# Patient Record
Sex: FEMALE | Race: WHITE | NOT HISPANIC OR LATINO | URBAN - METROPOLITAN AREA
[De-identification: names, ages, dates, MRNs, and addresses within clinical notes are randomized per-mention and may not be internally consistent; named-entity substitution may affect disease eponyms.]

---

## 2021-05-31 ENCOUNTER — EMERGENCY (EMERGENCY)
Facility: HOSPITAL | Age: 71
LOS: 1 days | Discharge: NURSING, REHAB OR LONG-TERM CARE | End: 2021-05-31
Attending: EMERGENCY MEDICINE | Admitting: EMERGENCY MEDICINE
Payer: MEDICARE

## 2021-05-31 VITALS
OXYGEN SATURATION: 100 % | TEMPERATURE: 98 F | RESPIRATION RATE: 18 BRPM | HEIGHT: 63 IN | DIASTOLIC BLOOD PRESSURE: 78 MMHG | SYSTOLIC BLOOD PRESSURE: 145 MMHG | HEART RATE: 62 BPM | WEIGHT: 117.95 LBS

## 2021-05-31 VITALS
OXYGEN SATURATION: 97 % | DIASTOLIC BLOOD PRESSURE: 75 MMHG | RESPIRATION RATE: 16 BRPM | TEMPERATURE: 98 F | SYSTOLIC BLOOD PRESSURE: 142 MMHG | HEART RATE: 65 BPM

## 2021-05-31 PROCEDURE — 99284 EMERGENCY DEPT VISIT MOD MDM: CPT | Mod: 25

## 2021-05-31 PROCEDURE — 72125 CT NECK SPINE W/O DYE: CPT | Mod: 26,MA

## 2021-05-31 PROCEDURE — 70450 CT HEAD/BRAIN W/O DYE: CPT

## 2021-05-31 PROCEDURE — 12001 RPR S/N/AX/GEN/TRNK 2.5CM/<: CPT

## 2021-05-31 PROCEDURE — 70450 CT HEAD/BRAIN W/O DYE: CPT | Mod: 26,MA

## 2021-05-31 PROCEDURE — 72125 CT NECK SPINE W/O DYE: CPT

## 2021-05-31 NOTE — ED PROVIDER NOTE - OBJECTIVE STATEMENT
Pt is 70 Pt is 71 yo female who presents to the ED with a cc of laceration. PMHx of dementia. Pt with dementia unable to answer questions at this time. Per EMS reports aides were attempting to dress the pt when she became agitated, went to swing at a staff member, lost her balance and fell backwards striking her head on the edge of the door frame and sustaining a laceration to the back of her head. Denies LOC. Staff reports that the pt is otherwise at baseline Pt is 71 yo female who presents to the ED with a cc of laceration. PMHx of dementia, HLD, hypothyroidism. Pt with dementia unable to answer questions at this time. Per EMS reports aides were attempting to dress the pt when she became agitated, went to swing at a staff member, lost her balance and fell backwards striking her head on the edge of the door frame and sustaining a laceration to the back of her head. Denies LOC. Staff reports that the pt is otherwise at baseline

## 2021-05-31 NOTE — ED ADULT TRIAGE NOTE - CHIEF COMPLAINT QUOTE
according to EMS pt was agitated when NA was washing her - she went to swing her arm & fell backwards hitting head on door frame- laceration noted. No LOC

## 2021-05-31 NOTE — ED PROVIDER NOTE - CLINICAL SUMMARY MEDICAL DECISION MAKING FREE TEXT BOX
Pt is 69 yo female who presents to the ED with a cc of laceration. PMHx of dementia. Pt with dementia unable to answer questions at this time. Per EMS reports aides were attempting to dress the pt when she became agitated, went to swing at a staff member, lost her balance and fell backwards striking her head on the edge of the door frame and sustaining a laceration to the back of her head. Denies LOC. Staff reports that the pt is otherwise at baseline Pt is 71 yo female who presents to the ED with a cc of laceration. PMHx of dementia. Pt with dementia unable to answer questions at this time. Per EMS reports aides were attempting to dress the pt when she became agitated, went to swing at a staff member, lost her balance and fell backwards striking her head on the edge of the door frame and sustaining a laceration to the back of her head. Denies LOC. Staff reports that the pt is otherwise at baseline. Pt with witnessed mechanical fall no LOC no AC. Sustained scalp laceration. Will clean and repair wound. As pt has advanced dementia and is unable to provide history will obtain CT head

## 2021-05-31 NOTE — ED PROVIDER NOTE - PATIENT PORTAL LINK FT
You can access the FollowMyHealth Patient Portal offered by [facility] by registering at the following website: [email or web address]. By joining [facility]’s FollowMyHealth portal, you will also be able to view your health information using other applications (apps) compatible with our system.

## 2021-05-31 NOTE — ED PROVIDER NOTE - NSFOLLOWUPINSTRUCTIONS_ED_ALL_ED_FT
Return to the ED for any new or worsening symptoms  Take your medication as prescribed  Staple removal in 7-10 days   Advance activity as tolerated

## 2021-05-31 NOTE — ED ADULT NURSE NOTE - OBJECTIVE STATEMENT
Presents to ED via amb from facility. Pt was agitated with NA who was bathing her so she went to swing her arm & fell backwards hitting her head on door frame. No LOC. Sustained laceration to back of head. According to staff pt is her baseline. No vomiting

## 2021-05-31 NOTE — ED PROVIDER NOTE - PROGRESS NOTE DETAILS
spoke with son Negrito confirms that mother is at baseline with advanced dementia. Plan reviewed. Results of imaging reviewed in agreement with discharge home. Pt has been calm during visit with no episodes of combativeness

## 2021-06-07 ENCOUNTER — EMERGENCY (EMERGENCY)
Facility: HOSPITAL | Age: 71
LOS: 1 days | Discharge: HOME | End: 2021-06-07
Attending: EMERGENCY MEDICINE | Admitting: EMERGENCY MEDICINE
Payer: MEDICARE

## 2021-06-07 VITALS
RESPIRATION RATE: 18 BRPM | SYSTOLIC BLOOD PRESSURE: 111 MMHG | HEART RATE: 82 BPM | TEMPERATURE: 99 F | OXYGEN SATURATION: 96 % | DIASTOLIC BLOOD PRESSURE: 56 MMHG | WEIGHT: 119.93 LBS

## 2021-06-07 VITALS
OXYGEN SATURATION: 98 % | DIASTOLIC BLOOD PRESSURE: 64 MMHG | SYSTOLIC BLOOD PRESSURE: 123 MMHG | TEMPERATURE: 97 F | HEART RATE: 74 BPM | RESPIRATION RATE: 16 BRPM

## 2021-06-07 LAB
ALBUMIN SERPL ELPH-MCNC: 3.5 G/DL — SIGNIFICANT CHANGE UP (ref 3.3–5)
ALP SERPL-CCNC: 72 U/L — SIGNIFICANT CHANGE UP (ref 40–120)
ALT FLD-CCNC: 18 U/L — SIGNIFICANT CHANGE UP (ref 12–78)
AMMONIA BLD-MCNC: 27 UMOL/L — SIGNIFICANT CHANGE UP (ref 11–32)
ANION GAP SERPL CALC-SCNC: 5 MMOL/L — SIGNIFICANT CHANGE UP (ref 5–17)
APPEARANCE UR: ABNORMAL
AST SERPL-CCNC: 18 U/L — SIGNIFICANT CHANGE UP (ref 15–37)
BASOPHILS # BLD AUTO: 0 K/UL — SIGNIFICANT CHANGE UP (ref 0–0.2)
BASOPHILS NFR BLD AUTO: 0 % — SIGNIFICANT CHANGE UP (ref 0–2)
BILIRUB SERPL-MCNC: 0.5 MG/DL — SIGNIFICANT CHANGE UP (ref 0.2–1.2)
BILIRUB UR-MCNC: ABNORMAL
BUN SERPL-MCNC: 29 MG/DL — HIGH (ref 7–23)
CALCIUM SERPL-MCNC: 9.3 MG/DL — SIGNIFICANT CHANGE UP (ref 8.5–10.1)
CHLORIDE SERPL-SCNC: 109 MMOL/L — HIGH (ref 96–108)
CO2 SERPL-SCNC: 31 MMOL/L — SIGNIFICANT CHANGE UP (ref 22–31)
COLOR SPEC: YELLOW — SIGNIFICANT CHANGE UP
CREAT SERPL-MCNC: 0.9 MG/DL — SIGNIFICANT CHANGE UP (ref 0.5–1.3)
DIFF PNL FLD: ABNORMAL
EOSINOPHIL # BLD AUTO: 0.04 K/UL — SIGNIFICANT CHANGE UP (ref 0–0.5)
EOSINOPHIL NFR BLD AUTO: 1 % — SIGNIFICANT CHANGE UP (ref 0–6)
GLUCOSE SERPL-MCNC: 98 MG/DL — SIGNIFICANT CHANGE UP (ref 70–99)
GLUCOSE UR QL: NEGATIVE — SIGNIFICANT CHANGE UP
HCT VFR BLD CALC: 38.2 % — SIGNIFICANT CHANGE UP (ref 34.5–45)
HGB BLD-MCNC: 12.6 G/DL — SIGNIFICANT CHANGE UP (ref 11.5–15.5)
KETONES UR-MCNC: ABNORMAL
LEUKOCYTE ESTERASE UR-ACNC: ABNORMAL
LYMPHOCYTES # BLD AUTO: 0.84 K/UL — LOW (ref 1–3.3)
LYMPHOCYTES # BLD AUTO: 21 % — SIGNIFICANT CHANGE UP (ref 13–44)
MCHC RBC-ENTMCNC: 30.4 PG — SIGNIFICANT CHANGE UP (ref 27–34)
MCHC RBC-ENTMCNC: 33 GM/DL — SIGNIFICANT CHANGE UP (ref 32–36)
MCV RBC AUTO: 92 FL — SIGNIFICANT CHANGE UP (ref 80–100)
MONOCYTES # BLD AUTO: 0.76 K/UL — SIGNIFICANT CHANGE UP (ref 0–0.9)
MONOCYTES NFR BLD AUTO: 19 % — HIGH (ref 2–14)
NEUTROPHILS # BLD AUTO: 2.37 K/UL — SIGNIFICANT CHANGE UP (ref 1.8–7.4)
NEUTROPHILS NFR BLD AUTO: 59 % — SIGNIFICANT CHANGE UP (ref 43–77)
NITRITE UR-MCNC: NEGATIVE — SIGNIFICANT CHANGE UP
NRBC # BLD: SIGNIFICANT CHANGE UP /100 WBCS (ref 0–0)
PH UR: 5 — SIGNIFICANT CHANGE UP (ref 5–8)
PLATELET # BLD AUTO: 215 K/UL — SIGNIFICANT CHANGE UP (ref 150–400)
POTASSIUM SERPL-MCNC: 4.1 MMOL/L — SIGNIFICANT CHANGE UP (ref 3.5–5.3)
POTASSIUM SERPL-SCNC: 4.1 MMOL/L — SIGNIFICANT CHANGE UP (ref 3.5–5.3)
PROT SERPL-MCNC: 7.3 G/DL — SIGNIFICANT CHANGE UP (ref 6–8.3)
PROT UR-MCNC: 30 MG/DL
RBC # BLD: 4.15 M/UL — SIGNIFICANT CHANGE UP (ref 3.8–5.2)
RBC # FLD: 12.5 % — SIGNIFICANT CHANGE UP (ref 10.3–14.5)
SARS-COV-2 RNA SPEC QL NAA+PROBE: SIGNIFICANT CHANGE UP
SODIUM SERPL-SCNC: 145 MMOL/L — SIGNIFICANT CHANGE UP (ref 135–145)
SP GR SPEC: 1.02 — SIGNIFICANT CHANGE UP (ref 1.01–1.02)
TSH SERPL-MCNC: 0.55 UIU/ML — SIGNIFICANT CHANGE UP (ref 0.36–3.74)
UROBILINOGEN FLD QL: 1
WBC # BLD: 4.01 K/UL — SIGNIFICANT CHANGE UP (ref 3.8–10.5)
WBC # FLD AUTO: 4.01 K/UL — SIGNIFICANT CHANGE UP (ref 3.8–10.5)

## 2021-06-07 PROCEDURE — 71045 X-RAY EXAM CHEST 1 VIEW: CPT | Mod: 26

## 2021-06-07 PROCEDURE — 84443 ASSAY THYROID STIM HORMONE: CPT

## 2021-06-07 PROCEDURE — 70450 CT HEAD/BRAIN W/O DYE: CPT

## 2021-06-07 PROCEDURE — 85025 COMPLETE CBC W/AUTO DIFF WBC: CPT

## 2021-06-07 PROCEDURE — 99284 EMERGENCY DEPT VISIT MOD MDM: CPT | Mod: 25

## 2021-06-07 PROCEDURE — 70450 CT HEAD/BRAIN W/O DYE: CPT | Mod: 26,MA

## 2021-06-07 PROCEDURE — 81001 URINALYSIS AUTO W/SCOPE: CPT

## 2021-06-07 PROCEDURE — 80053 COMPREHEN METABOLIC PANEL: CPT

## 2021-06-07 PROCEDURE — 87086 URINE CULTURE/COLONY COUNT: CPT

## 2021-06-07 PROCEDURE — 36415 COLL VENOUS BLD VENIPUNCTURE: CPT

## 2021-06-07 PROCEDURE — U0005: CPT

## 2021-06-07 PROCEDURE — 82140 ASSAY OF AMMONIA: CPT

## 2021-06-07 PROCEDURE — 71045 X-RAY EXAM CHEST 1 VIEW: CPT

## 2021-06-07 PROCEDURE — U0003: CPT

## 2021-06-07 PROCEDURE — 99284 EMERGENCY DEPT VISIT MOD MDM: CPT

## 2021-06-07 NOTE — ED ADULT NURSE NOTE - CHIEF COMPLAINT QUOTE
Patient is breathing normally. Closed the eyes and lips  tightly.  Able to follow commandments. H/O psychosis

## 2021-06-07 NOTE — ED PROVIDER NOTE - NEUROLOGICAL, MLM
somnolent, but will open eyes. answers some questions and follows some commands (then goes back to bed)

## 2021-06-07 NOTE — ED PROVIDER NOTE - ATTENDING CONTRIBUTION TO CARE
71 yo f who is resident of [facility] neurocognitive do psychosis and dementia sent for exacerbation of psychotic features pt is poort historian  on eval  keeps eyes closed answers yes and no to questions not oriented no distress  heent nc at mmm perrla eomi  cor rrr  chest cta  abd soft flat with loose skin evid of wt loss no grossly visible scars or skin infectino  back normal  ext pt has no deformity is moving all ext  neuro pt sleepy awakens to verbal and simple commands no focal  plan ct labs ro infection ro trauma ro metabolic vs exacerbation of the underlying psychosis.  matt pmd

## 2021-06-07 NOTE — ED ADULT NURSE NOTE - ED STAT RN HANDOFF DETAILS
Sarai from [facility] called for update; explained all tests done and results, advised waiting for ambulance p/u expected around 2000

## 2021-06-07 NOTE — ED PROVIDER NOTE - NSFOLLOWUPINSTRUCTIONS_ED_ALL_ED_FT
drink plenty of fluids  continue medications as prescribed   have close follow up with primary care provider       Weakness    WHAT YOU NEED TO KNOW:    Weakness is a loss of muscle strength. It may be caused by brain, nerve, or muscle problems. Physical and mental conditions such as heart problems, pregnancy, dehydration, or depression may also cause weakness. Reactions to certain drugs can cause weakness. Parts of your body may become weak if you need to wear a cast or splint or have been on bed rest for a long time.    DISCHARGE INSTRUCTIONS:    Call 911 for any of the following:   •You have any of the following signs of a stroke: ?Numbness or drooping on one side of your face       ?Weakness in an arm or leg      ?Confusion or difficulty speaking      ?Dizziness, a severe headache, or vision loss      •You lose feeling in your weakened body area.      •You have electric shock-like feelings down your arms and legs when you flex or move your neck.      •You have sudden or increased trouble speaking, swallowing, or breathing.      Return to the emergency department if:   •You have severe pain in your back, arms, or legs that worsens.      •You have sudden or worsened muscle weakness or loss of movement.      •You are not able to control when you urinate or have a bowel movement.      Contact your healthcare provider if:   •You feel depressed or anxious.       •You have questions or concerns about your condition or care.       Manage weakness:   •Use assistive devices as directed. These help protect you from injury. Examples include a walker or cane. Have someone install handrails in your home. These will help you get out of a bathtub or stand up from a toilet. Use a shower chair so you can sit while you shower. Sit down on the toilet or another chair to dry off and put on your clothes. Get help going up and down stairs if your legs are weak.       •Go to physical or occupational therapy if directed. A physical therapist can teach you exercises to help strengthen weak muscles. An occupational therapist can show you ways to do your daily activities more easily. For example, light forks and spoons can be easier to use if you have hand weakness. You may also learn ways to organize your household items so you are not moving heavy items.      •Balance rest with exercise. Exercise can help increase your muscle strength and energy. Do not exercise for long periods at a time. Take breaks often to rest. Too much exercise can cause muscle strain or make you more tired. Ask your healthcare provider how much exercise is right for you.      •Eat a variety of healthy foods. Too much or too little food may cause weakness or tiredness. Ask your healthcare provider what a healthy amount of food is for you. Healthy foods include fruits, vegetables, whole-grain breads, low-fat dairy products, lean meats and fish, nuts, and cooked beans.      •Do not smoke. Nicotine and other chemicals in cigarettes and cigars can make your symptoms worse, and can cause lung damage. Ask your healthcare provider for information if you currently smoke and need help to quit. E-cigarettes or smokeless tobacco still contain nicotine. Talk to your healthcare provider before you use these products.       •Do not use caffeine, alcohol, or illegal drugs. These may cause muscle twitching, which could lead to worsened weakness.       Follow up with your healthcare provider as directed: Write down your questions so you remember to ask them during your visits.

## 2021-06-07 NOTE — ED PROVIDER NOTE - PATIENT PORTAL LINK FT
You can access the FollowMyHealth Patient Portal offered by [facility] by registering at the following website: [email or web address]. By joining [employer]’s FollowMyHealth portal, you will also be able to view your health information using other applications (apps) compatible with our system.

## 2021-06-07 NOTE — ED PROVIDER NOTE - CLINICAL SUMMARY MEDICAL DECISION MAKING FREE TEXT BOX
sent to ED for not answering questions of following commands. limited history due to dementia and neurocognitive disorder with psychosis. patient somnolent on exam but will open eyes and follows some commands and answers some questions (not all). afebrile. vitals stable. does not appear to be in distress or pain. differential include but not limited to dehydration, electrolyte abnormality, anemia, ICH, CVA, infection. will check labs, UA, CXR, CT head

## 2021-06-07 NOTE — ED PROVIDER NOTE - CARE PLAN
Principal Discharge DX:	Weakness  Secondary Diagnosis:	Neurocognitive disorder  Secondary Diagnosis:	Dementia

## 2021-06-07 NOTE — ED ADULT NURSE NOTE - PMH
CAD (coronary artery disease)    Constipation    Dementia    Hypothyroidism    Neurocognitive disorder  with psychosis

## 2021-06-07 NOTE — ED ADULT NURSE NOTE - NSIMPLEMENTINTERV_GEN_ALL_ED
Implemented All Fall Risk Interventions:  [place] to call system. Call bell, personal items and telephone within reach. Instruct patient to call for assistance. Room bathroom lighting operational. Non-slip footwear when patient is off stretcher. Physically safe environment: no spills, clutter or unnecessary equipment. Stretcher in lowest position, wheels locked, appropriate side rails in place. Provide visual cue, wrist band, yellow gown, etc. Monitor gait and stability. Monitor for mental status changes and reorient to person, place, and time. Review medications for side effects contributing to fall risk. Reinforce activity limits and safety measures with patient and family.

## 2021-06-07 NOTE — ED PROVIDER NOTE - OBJECTIVE STATEMENT
70 year old female with history of neurocognitive disorder with psychosis, hypothyroid, CAD, dementia, and constipation BIBA for not answering questions or following commands. no known 70 year old female with history of neurocognitive disorder with psychosis, hypothyroid, CAD, dementia, and constipation BIBA for not answering questions or following commands. no known fevers, recent illnesses, vomiting, or diarrhea. history of neurocognitive disorder with psychosis. limited history due to dementia. resident at [facility]  PCP Rober

## 2021-06-07 NOTE — ED ADULT TRIAGE NOTE - CHIEF COMPLAINT QUOTE
Patient is breathing normally. Closed the eyes and lips  tightly.  Able to follow commandments. H/O psychosis Patient is breathing normally. Closed the eyes and lips  tightly.  Able to follow comands. H/O psychosis

## 2021-06-07 NOTE — ED ADULT NURSE NOTE - OBJECTIVE STATEMENT
Pt to ED via ambulance sent by [facility] assisted living for evaluation.  Per EMS, pt was "unresponsive."  However, pt has history of psychosis and on arrival refuses to speak but follows simple commands and is aware enough to hold herself when she feels as if she is falling (when being moved from stretcher to stretcher).  Pt answered question one time when asked her last name, she stated "Cedric."  Otherwise, pt in no distress, resting with eyes closed, resp even & unlabored.

## 2021-06-08 PROBLEM — F03.90 UNSPECIFIED DEMENTIA, UNSPECIFIED SEVERITY, WITHOUT BEHAVIORAL DISTURBANCE, PSYCHOTIC DISTURBANCE, MOOD DISTURBANCE, AND ANXIETY: Chronic | Status: ACTIVE | Noted: 2021-05-31

## 2021-06-08 PROBLEM — E03.9 HYPOTHYROIDISM, UNSPECIFIED: Chronic | Status: ACTIVE | Noted: 2021-05-31

## 2021-06-08 PROBLEM — E78.5 HYPERLIPIDEMIA, UNSPECIFIED: Chronic | Status: ACTIVE | Noted: 2021-05-31

## 2021-06-08 LAB
CULTURE RESULTS: SIGNIFICANT CHANGE UP
SPECIMEN SOURCE: SIGNIFICANT CHANGE UP

## 2021-06-13 ENCOUNTER — EMERGENCY (EMERGENCY)
Facility: HOSPITAL | Age: 71
LOS: 1 days | Discharge: NURSING, REHAB OR LONG-TERM CARE | End: 2021-06-13
Attending: EMERGENCY MEDICINE | Admitting: EMERGENCY MEDICINE
Payer: MEDICARE

## 2021-06-13 VITALS
OXYGEN SATURATION: 96 % | TEMPERATURE: 99 F | RESPIRATION RATE: 20 BRPM | DIASTOLIC BLOOD PRESSURE: 82 MMHG | HEART RATE: 82 BPM | SYSTOLIC BLOOD PRESSURE: 127 MMHG

## 2021-06-13 VITALS
HEIGHT: 63 IN | TEMPERATURE: 99 F | RESPIRATION RATE: 18 BRPM | OXYGEN SATURATION: 96 % | DIASTOLIC BLOOD PRESSURE: 84 MMHG | SYSTOLIC BLOOD PRESSURE: 134 MMHG | WEIGHT: 117.95 LBS | HEART RATE: 90 BPM

## 2021-06-13 PROBLEM — E03.9 HYPOTHYROIDISM, UNSPECIFIED: Chronic | Status: ACTIVE | Noted: 2021-06-07

## 2021-06-13 PROBLEM — I25.10 ATHEROSCLEROTIC HEART DISEASE OF NATIVE CORONARY ARTERY WITHOUT ANGINA PECTORIS: Chronic | Status: ACTIVE | Noted: 2021-06-07

## 2021-06-13 PROBLEM — R41.9 UNSPECIFIED SYMPTOMS AND SIGNS INVOLVING COGNITIVE FUNCTIONS AND AWARENESS: Chronic | Status: ACTIVE | Noted: 2021-06-07

## 2021-06-13 PROBLEM — K59.00 CONSTIPATION, UNSPECIFIED: Chronic | Status: ACTIVE | Noted: 2021-06-07

## 2021-06-13 PROCEDURE — 99284 EMERGENCY DEPT VISIT MOD MDM: CPT

## 2021-06-13 PROCEDURE — 99284 EMERGENCY DEPT VISIT MOD MDM: CPT | Mod: 25

## 2021-06-13 PROCEDURE — 70450 CT HEAD/BRAIN W/O DYE: CPT

## 2021-06-13 PROCEDURE — 72125 CT NECK SPINE W/O DYE: CPT | Mod: 26,MH

## 2021-06-13 PROCEDURE — 70450 CT HEAD/BRAIN W/O DYE: CPT | Mod: 26,MH

## 2021-06-13 PROCEDURE — 72125 CT NECK SPINE W/O DYE: CPT

## 2021-06-13 RX ORDER — LEVOTHYROXINE SODIUM 125 MCG
1 TABLET ORAL
Qty: 0 | Refills: 0 | DISCHARGE

## 2021-06-13 RX ORDER — ESCITALOPRAM OXALATE 10 MG/1
1 TABLET, FILM COATED ORAL
Qty: 0 | Refills: 0 | DISCHARGE

## 2021-06-13 RX ORDER — LANOLIN ALCOHOL/MO/W.PET/CERES
1 CREAM (GRAM) TOPICAL
Qty: 0 | Refills: 0 | DISCHARGE

## 2021-06-13 RX ORDER — DONEPEZIL HYDROCHLORIDE 10 MG/1
1 TABLET, FILM COATED ORAL
Qty: 0 | Refills: 0 | DISCHARGE

## 2021-06-13 RX ORDER — ATORVASTATIN CALCIUM 80 MG/1
1 TABLET, FILM COATED ORAL
Qty: 0 | Refills: 0 | DISCHARGE

## 2021-06-13 RX ORDER — HALOPERIDOL DECANOATE 100 MG/ML
2 INJECTION INTRAMUSCULAR
Qty: 0 | Refills: 0 | DISCHARGE

## 2021-06-13 NOTE — ED PROVIDER NOTE - CLINICAL SUMMARY MEDICAL DECISION MAKING FREE TEXT BOX
[id number] 71 yo F with hx of cad, dementia biba from NH for eval of fall from chair this morning, not on AC, pt denies any complaints but poor historian; abd soft and NT, no chest wall ecchymosis no C/T/L spine ecchymosis, FROM X4, nonfocal neuro exam; will get ct head and cspine, d/c if neg

## 2021-06-13 NOTE — ED PROVIDER NOTE - MUSCULOSKELETAL, MLM
Spine appears normal, range of motion is not limited, no muscle or joint tenderness, no C/T/L spine ttp/ecchymosis noted

## 2021-06-13 NOTE — ED PROVIDER NOTE - CARE PLAN
Principal Discharge DX:	Fall   Principal Discharge DX:	Head injury  Secondary Diagnosis:	Fall, initial encounter

## 2021-06-13 NOTE — ED ADULT NURSE REASSESSMENT NOTE - NS ED NURSE REASSESS COMMENT FT1
Picked up for transport back to Nursing [facility] facility All belongings in bag with patient on transport stretcher.

## 2021-06-13 NOTE — ED ADULT NURSE NOTE - PMH
CAD (coronary artery disease)    Constipation    Dementia    Dementia    HLD (hyperlipidemia)    Hypothyroidism    Hypothyroidism    Neurocognitive disorder  with psychosis

## 2021-06-13 NOTE — ED PROVIDER NOTE - PATIENT PORTAL LINK FT
You can access the FollowMyHealth Patient Portal offered by [facility] by registering at the following website: [email or web address]. By joining [employer]’s FollowMyHealth portal, you will also be able to view your health information using other applications (apps) compatible with our system.

## 2021-06-13 NOTE — ED PROVIDER NOTE - CONSTITUTIONAL, MLM
normal... appears somnolent but opens eyes and follows some commands, pleasantly confused and in no apparent distress. Chronically ill appearing, pleasantly confused and in no apparent distress.

## 2021-06-13 NOTE — ED ADULT NURSE REASSESSMENT NOTE - NS ED NURSE REASSESS COMMENT FT1
Patient ambulated approx. 30 feet with assistance, PA [place] witnessed walk. Patient denies pain, denies SOB. C collar was cleared by Dr Cuello prior to ambulation.

## 2021-06-13 NOTE — ED PROVIDER NOTE - CARE PROVIDER_API CALL
Tristan Richter)  Critical Care Medicine; Internal Medicine; Pulmonary Disease  [facility] [place] [address]  Phone: [phone]  Fax: [phone]  Follow Up Time: 1-3 Days

## 2021-06-13 NOTE — ED PROVIDER NOTE - RESPIRATORY, MLM
Breath sounds clear and equal bilaterally. No chest wall ecchymosis notedc Breath sounds clear and equal bilaterally. No ribs tenderness or ecchymosis noted

## 2021-06-13 NOTE — ED ADULT NURSE NOTE - OBJECTIVE STATEMENT
Un witnessed fall from a chair. No LOC. Denies pain. BIBA from [facility], c collar in place.

## 2021-06-13 NOTE — ED PROVIDER NOTE - NEUROLOGICAL, MLM
opens eyes, pleasantly confused. no focal deficits, no motor or sensory deficits. opens eyes, pleasantly confused, follows some commands. no focal deficits, no motor or sensory deficits.

## 2021-06-13 NOTE — ED PROVIDER NOTE - ENMT, MLM
Airway patent, Nasal mucosa clear. Mouth with normal mucosa. EOMI Airway patent, Nasal mucosa clear.

## 2021-06-13 NOTE — ED ADULT NURSE NOTE - INTERVENTIONS DEFINITIONS
[place] to call system/Instruct patient to call for assistance/Stretcher in lowest position, wheels locked, appropriate side rails in place/Provide visual clues: red socks

## 2021-06-13 NOTE — ED PROVIDER NOTE - OBJECTIVE STATEMENT
71 y/o F with hx of dementia, neurocognitive disorder with psychosis, hypothyroid, CAD BIBA from [facility] for evaluation s/p fall today. As per EMS, pt fell from chair today. No use of anticoagulants as per medication records. Unable to obtain info from pt due to dementia. Denies any pain or complaints at this time.  pcp: Dr. Richter

## 2021-06-28 ENCOUNTER — EMERGENCY (EMERGENCY)
Facility: HOSPITAL | Age: 71
LOS: 1 days | Discharge: HOME | End: 2021-06-28
Attending: EMERGENCY MEDICINE | Admitting: EMERGENCY MEDICINE
Payer: MEDICARE

## 2021-06-28 VITALS
WEIGHT: 160.06 LBS | SYSTOLIC BLOOD PRESSURE: 126 MMHG | RESPIRATION RATE: 18 BRPM | HEART RATE: 74 BPM | OXYGEN SATURATION: 100 % | HEIGHT: 63 IN | DIASTOLIC BLOOD PRESSURE: 70 MMHG | TEMPERATURE: 98 F

## 2021-06-28 VITALS
OXYGEN SATURATION: 100 % | HEART RATE: 80 BPM | TEMPERATURE: 98 F | RESPIRATION RATE: 20 BRPM | DIASTOLIC BLOOD PRESSURE: 78 MMHG | SYSTOLIC BLOOD PRESSURE: 136 MMHG

## 2021-06-28 PROCEDURE — 70450 CT HEAD/BRAIN W/O DYE: CPT | Mod: 26,MA

## 2021-06-28 PROCEDURE — 70450 CT HEAD/BRAIN W/O DYE: CPT

## 2021-06-28 PROCEDURE — 99284 EMERGENCY DEPT VISIT MOD MDM: CPT

## 2021-06-28 PROCEDURE — 99284 EMERGENCY DEPT VISIT MOD MDM: CPT | Mod: 25

## 2021-06-28 NOTE — ED PROVIDER NOTE - NEUROLOGICAL, MLM
alert, not answering questions, appears to almost follow some commands, moving around spontaneously on stretcher

## 2021-06-28 NOTE — ED ADULT NURSE NOTE - NSINTERVENTIONOPT_GEN_ALL_ED
Stretcher Alarms/[place]our[place] Rounding/Enhanced Supervision/Move Toilet (commode/urinal) to patient using "arms reach"

## 2021-06-28 NOTE — ED PROVIDER NOTE - NSFOLLOWUPINSTRUCTIONS_ED_ALL_ED_FT
Fall Prevention    WHAT YOU NEED TO KNOW:    Fall prevention includes ways to make your home and other areas safer. It also includes ways you can move more carefully to prevent a fall. Health conditions that cause changes in your blood pressure, vision, or muscle strength and coordination may increase your risk for falls. Medicines may also increase your risk for falls if they make you dizzy, weak, or sleepy.    DISCHARGE INSTRUCTIONS:    Call 911 or have someone else call if:   •You have fallen and are unconscious.      •You have fallen and cannot move part of your body.      Contact your healthcare provider if:   •You have fallen and have pain or a headache.      •You have questions or concerns about your condition or care.      Fall prevention tips:   •Stand or sit up slowly. This may help you keep your balance and prevent falls.      •Use assistive devices as directed. Your healthcare provider may suggest that you use a cane or walker to help you keep your balance. You may need to have grab bars put in your bathroom near the toilet or in the shower.      •Wear shoes that fit well and have soles that . Wear shoes both inside and outside. Use slippers with good . Do not wear shoes with high heels.      •Wear a personal alarm. This is a device that allows you to call 911 if you fall and need help. Ask your healthcare provider for more information.      •Stay active. Exercise can help strengthen your muscles and improve your balance. Your healthcare provider may recommend water aerobics or walking. He or she may also recommend physical therapy to improve your coordination. Never start an exercise program without talking to your healthcare provider first.  Walking for Exercise           •Manage your medical conditions.  Keep all appointments with your healthcare providers. Visit your eye doctor as directed.      Home safety tips:     Fall Prevention for Adults     •Add items to prevent falls in the bathroom. Put nonslip strips on your bath or shower floor to prevent you from slipping. Use a bath mat if you do not have carpet in the bathroom. This will prevent you from falling when you step out of the bath or shower. Use a shower seat so you do not need to stand while you shower. Sit on the toilet or a chair in your bathroom to dry yourself and put on clothing. This will prevent you from losing your balance from drying or dressing yourself while you are standing.      •Keep paths clear. Remove books, shoes, and other objects from walkways and stairs. Place cords for telephones and lamps out of the way so that you do not need to walk over them. Tape them down if you cannot move them. Remove small rugs. If you cannot remove a rug, secure it with double-sided tape. This will prevent you from tripping.      •Install bright lights in your home. Use night lights to help light paths to the bathroom or kitchen. Always turn on the light before you start walking.      •Keep items you use often on shelves within reach. Do not use a step stool to help you reach an item.      •Paint or place reflective tape on the edges of your stairs. This will help you see the stairs better.      Follow up with your healthcare provider as directed: Write down your questions so you remember to ask them during your visits.

## 2021-06-28 NOTE — ED ADULT NURSE NOTE - NSIMPLEMENTINTERV_GEN_ALL_ED
Implemented All Fall Risk Interventions:  [place] to call system. Call bell, personal items and telephone within reach. Instruct patient to call for assistance. Room bathroom lighting operational. Non-slip footwear when patient is off stretcher. Physically safe environment: no spills, clutter or unnecessary equipment. Stretcher in lowest position, wheels locked, appropriate side rails in place. Provide visual cue, wrist band, yellow gown, etc. Monitor gait and stability. Monitor for mental status changes and reorient to person, place, and time. Review medications for side effects contributing to fall risk. Reinforce activity limits and safety measures with patient and family.

## 2021-06-28 NOTE — ED PROVIDER NOTE - OBJECTIVE STATEMENT
70 y.o. F BIB EMS from assisted living for fall, no loc, no apparent injury, however pt is not very communicative so brought in for evaluation 70 y.o. F BIB EMS from assisted living for fall, no loc, staff and ems reported red contusion of scalp, however pt is not very communicative, no further history, so brought in for evaluation

## 2021-06-28 NOTE — ED PROVIDER NOTE - PATIENT PORTAL LINK FT
You can access the FollowMyHealth Patient Portal offered by [facility] by registering at the following website: [email or web address]. By joining [employer]’s FollowMyHealth portal, you will also be able to view your health information using other applications (apps) compatible with our system.

## 2021-06-28 NOTE — ED PROVIDER NOTE - MUSCULOSKELETAL, MLM
no spinal step off, no apparent bony ttp, no deformities, good ROM thoughout, equal spontaneous movement all extremities

## 2021-08-24 ENCOUNTER — EMERGENCY (EMERGENCY)
Facility: HOSPITAL | Age: 71
LOS: 1 days | Discharge: HOME | End: 2021-08-24
Attending: EMERGENCY MEDICINE | Admitting: EMERGENCY MEDICINE
Payer: MEDICARE

## 2021-08-24 VITALS
HEART RATE: 68 BPM | RESPIRATION RATE: 18 BRPM | OXYGEN SATURATION: 98 % | DIASTOLIC BLOOD PRESSURE: 78 MMHG | SYSTOLIC BLOOD PRESSURE: 132 MMHG | TEMPERATURE: 99 F

## 2021-08-24 VITALS
DIASTOLIC BLOOD PRESSURE: 50 MMHG | TEMPERATURE: 99 F | HEIGHT: 63 IN | HEART RATE: 67 BPM | RESPIRATION RATE: 18 BRPM | OXYGEN SATURATION: 98 % | SYSTOLIC BLOOD PRESSURE: 121 MMHG | WEIGHT: 117.95 LBS

## 2021-08-24 PROCEDURE — 99283 EMERGENCY DEPT VISIT LOW MDM: CPT | Mod: 25

## 2021-08-24 PROCEDURE — 99284 EMERGENCY DEPT VISIT MOD MDM: CPT

## 2021-08-24 PROCEDURE — 71045 X-RAY EXAM CHEST 1 VIEW: CPT | Mod: 26

## 2021-08-24 PROCEDURE — 71045 X-RAY EXAM CHEST 1 VIEW: CPT

## 2021-08-24 NOTE — ED ADULT NURSE NOTE - OBJECTIVE STATEMENT
pt w/ hx of dementia brought in from [facility] s/p choking episode. As per EMS pt was given hemlich w/ resolution of symptoms. pt continued to cough so EMS was called. pt not coughing now in ED, 100% on RA, pt appears comfortable. resp even unlabored. pt judd SOB, Dyspnea or CP.

## 2021-08-24 NOTE — ED PROVIDER NOTE - CARE PROVIDER_API CALL
Tristan Richter)  Critical Care Medicine; Internal Medicine; Pulmonary Disease  [facility] [place] [address]  Phone: [phone]  Fax: [phone]  Established Patient  Follow Up Time: 1-3 Days

## 2021-08-24 NOTE — ED ADULT NURSE NOTE - NSICDXPASTMEDICALHX_GEN_ALL_CORE_FT
PAST MEDICAL HISTORY:  CAD (coronary artery disease)     Constipation     Dementia     HLD (hyperlipidemia)     Hypothyroidism     Hypothyroidism     Neurocognitive disorder with psychosis

## 2021-08-24 NOTE — ED ADULT TRIAGE NOTE - CHIEF COMPLAINT QUOTE
according to EMS pt choked & staff @ facility did Heimleich- nothing came up but pt has been  coughing

## 2021-08-24 NOTE — ED PROVIDER NOTE - CARE PLAN
1 Principal Discharge DX:	Choking episode  Secondary Diagnosis:	Dementia  Secondary Diagnosis:	Hiatal hernia

## 2021-08-24 NOTE — ED PROVIDER NOTE - NSFOLLOWUPINSTRUCTIONS_ED_ALL_ED_FT
Choking, Adult      Choking occurs when a food or object gets stuck in the throat or windpipe (trachea) and blocks the airway. If the airway is partly blocked, coughing will usually cause the food or object to come out. If the airway is completely blocked, immediate action is needed to make it come out. Obstruction of the airway can lead to respiratory failure and even death if untreated. The kind of treatment you offer depends on the severity of the choking.  A person has a complete airway blockage if he or she:  •Is holding his or her neck with both hands. This is considered a universal sign of choking.      •Is unable to breathe.      •Is making soft or high-pitched sounds while breathing.      •Is unable to cough or is coughing weakly, ineffectively, or silently.      •Is unable to cry, speak, or make sounds.      •Is turning blue or gray.        For partial airway blockage  If a person has a partial airway blockage and he or she is coughing and is able to speak:  •Do not interfere. Allow coughing to clear the airway.      •Do not let him or her try to drink until the food or object comes out.      •Stay with the person until the food or object comes out. Watch for signs of choking (complete airway blockage). If the person shows signs of complete airway blockage, you should take action to help the person.        For complete airway blockage     If a person has a complete airway blockage, his or her life is in danger. A complete airway blockage causes breathing to stop. This is a medical emergency that requires fast, appropriate action by anyone who is available.    Perform the Heimlich maneuver to save a person who is choking. The Heimlich maneuver, also called abdominal thrusts, uses pressure to force air from the abdomen into the throat to move the blockage.      CPR for an unconscious person   Do the following if the choking person is not breathing and either collapses or is found on the ground:1.Shout for help.  •If someone responds, tell that person to call local emergency services (911 in [place] and look for an automated external defibrillator (AED).      •If no one responds, begin 2 minutes of CPR.        2.Make sure the person is lying on a firm, flat surface, facing up. Begin CPR, starting with 30 chest compressions and 2 breaths. Every time you open the airway to give rescue breaths, open the person's mouth. If you can see the food or object and it can be easily pulled out, remove it with your fingers. Do not try to remove the food or object if you cannot see it so you do not push it farther into the airway.      3.After 5 cycles or 2 minutes of CPR, call local emergency services (027 in [place] if a call has not already been made.      4.Continue CPR until the person starts breathing or until help arrives.        If you are chokin.Call local emergency services (652 in [place] Do not worry about communicating what is happening. Do not hang up the phone. Someone may be sent to help you anyway.      2.Perform the Heimlich maneuver on yourself. To do this, hold a fist against your abdomen and bend over a hard surface, such as a chair. Forcefully push your fist in and up until the food or object comes out.      Prevention    •Chew food thoroughly.      •Know that older adults are at an increased risk of choking. They should chew smaller bites and cut their food into smaller portions.      •Avoid talking or laughing while you are chewing and swallowing.      To be prepared if choking occurs, take a certified first-aid course to learn how to correctly perform the Heimlich maneuver.      Contact a health care provider if:    •You have trouble swallowing food.      •You continue to have drooling after choking.      •You have persistent chest pain after choking.        Get help right away if:    •You have problems breathing after choking stops.      •You are confused, persistently drowsy, or have lost consciousness at any point.      •You were given CPR.      These symptoms may represent a serious problem that is an emergency. Do not wait to see if the symptoms will go away. Get medical help right away. Call your local emergency services (985 in the [place] Do not drive yourself to the hospital.       Summary    •Choking occurs when a food or object gets stuck in the throat (trachea) and blocks the airway. This prevents breathing and can lead to respiratory arrest and even death if untreated.      •If a person has a partial airway blockage and is able to talk and cough, do not interfere and do not allow the person to drink until the food or object comes out.      •If a person has a complete airway blockage, perform the Heimlich maneuver. Call local emergency services and take the person to a health care provider afterward especially if CPR was done.      •If the person is unconscious and not breathing, call local emergency services and perform CPR until the person breathes normally or until help arrives.      This information is not intended to replace advice given to you by your health care provider. Make sure you discuss any questions you have with your health care provider.

## 2021-08-24 NOTE — ED PROVIDER NOTE - OBJECTIVE STATEMENT
72 y/o with a PMHx of Dementia from [facility] presents to [facility] for choking spell and coughing. Denies any pain.

## 2021-09-15 ENCOUNTER — EMERGENCY (EMERGENCY)
Facility: HOSPITAL | Age: 71
LOS: 1 days | Discharge: NURSING, REHAB OR LONG-TERM CARE | End: 2021-09-15
Attending: EMERGENCY MEDICINE | Admitting: EMERGENCY MEDICINE
Payer: MEDICARE

## 2021-09-15 VITALS
DIASTOLIC BLOOD PRESSURE: 60 MMHG | WEIGHT: 117.95 LBS | HEIGHT: 63 IN | OXYGEN SATURATION: 96 % | HEART RATE: 72 BPM | SYSTOLIC BLOOD PRESSURE: 100 MMHG | RESPIRATION RATE: 18 BRPM | TEMPERATURE: 98 F

## 2021-09-15 VITALS
SYSTOLIC BLOOD PRESSURE: 106 MMHG | TEMPERATURE: 98 F | RESPIRATION RATE: 169 BRPM | DIASTOLIC BLOOD PRESSURE: 62 MMHG | HEART RATE: 72 BPM

## 2021-09-15 PROCEDURE — 71045 X-RAY EXAM CHEST 1 VIEW: CPT | Mod: 26

## 2021-09-15 PROCEDURE — 99284 EMERGENCY DEPT VISIT MOD MDM: CPT | Mod: 25

## 2021-09-15 PROCEDURE — 71045 X-RAY EXAM CHEST 1 VIEW: CPT

## 2021-09-15 PROCEDURE — 99283 EMERGENCY DEPT VISIT LOW MDM: CPT

## 2021-09-15 RX ORDER — DOCUSATE SODIUM 100 MG
1 CAPSULE ORAL
Qty: 0 | Refills: 0 | DISCHARGE

## 2021-09-15 RX ORDER — RISPERIDONE 4 MG/1
1 TABLET ORAL
Qty: 0 | Refills: 0 | DISCHARGE

## 2021-09-15 NOTE — ED ADULT TRIAGE NOTE - AS TEMP SITE
[facility] staff member called, they accidentally wrote in the paperwork faxed over to the clinic for patient to receive assistance in the home every 60 days but meant to say 90 days and would like to know if 90 days is acceptable. Message sent to Jer Lewis MD, please advise.    oral

## 2021-09-15 NOTE — ED PROVIDER NOTE - CARE PROVIDER_API CALL
Tristan Richter)  Critical Care Medicine; Internal Medicine; Pulmonary Disease  [address] [place] [address]  Phone: [phone]  Fax: [phone]  Established Patient  Follow Up Time: Urgent

## 2021-09-15 NOTE — ED ADULT NURSE NOTE - NSIMPLEMENTINTERV_GEN_ALL_ED
Implemented All Fall with Harm Risk Interventions:  [place] to call system. Call bell, personal items and telephone within reach. Instruct patient to call for assistance. Room bathroom lighting operational. Non-slip footwear when patient is off stretcher. Physically safe environment: no spills, clutter or unnecessary equipment. Stretcher in lowest position, wheels locked, appropriate side rails in place. Provide visual cue, wrist band, yellow gown, etc. Monitor gait and stability. Monitor for mental status changes and reorient to person, place, and time. Review medications for side effects contributing to fall risk. Reinforce activity limits and safety measures with patient and family. Provide visual clues: red socks.

## 2021-09-15 NOTE — ED ADULT NURSE NOTE - OBJECTIVE STATEMENT
was choking on chicken and rice and resolved no heimlich dine no cyanosis with event sent to be checked    patient is confused and unable to redirect, currently denies any pain and not noticed any distress, will continue to monitor. ,

## 2021-09-15 NOTE — ED PROVIDER NOTE - OBJECTIVE STATEMENT
72 y/o female with PMHx of dementia presents to the ED [facility] from [facility] for evaluation s/p choking episode. Pt was choking on chicken and rice at NH, which self-resolved. No Heimlich procedure was performed. Pt was sent in only to get evaluated. Pt was seen recently for the same. Pt has no complaints and does not even remember the event. Unable to obtain further history, secondary to patient's dementia.

## 2021-09-15 NOTE — ED PROVIDER NOTE - NSFOLLOWUPINSTRUCTIONS_ED_ALL_ED_FT
Choking, Adult      Choking occurs when a food or object gets stuck in the throat or windpipe (trachea) and blocks the airway. If the airway is partly blocked, coughing will usually cause the food or object to come out. If the airway is completely blocked, immediate action is needed to make it come out. Obstruction of the airway can lead to respiratory failure and even death if untreated. The kind of treatment you offer depends on the severity of the choking.  A person has a complete airway blockage if he or she:  •Is holding his or her neck with both hands. This is considered a universal sign of choking.      •Is unable to breathe.      •Is making soft or high-pitched sounds while breathing.      •Is unable to cough or is coughing weakly, ineffectively, or silently.      •Is unable to cry, speak, or make sounds.      •Is turning blue or gray.        For partial airway blockage  If a person has a partial airway blockage and he or she is coughing and is able to speak:  •Do not interfere. Allow coughing to clear the airway.      •Do not let him or her try to drink until the food or object comes out.      •Stay with the person until the food or object comes out. Watch for signs of choking (complete airway blockage). If the person shows signs of complete airway blockage, you should take action to help the person.        For complete airway blockage     If a person has a complete airway blockage, his or her life is in danger. A complete airway blockage causes breathing to stop. This is a medical emergency that requires fast, appropriate action by anyone who is available.    Perform the Heimlich maneuver to save a person who is choking. The Heimlich maneuver, also called abdominal thrusts, uses pressure to force air from the abdomen into the throat to move the blockage.      CPR for an unconscious person   Do the following if the choking person is not breathing and either collapses or is found on the ground:1.Shout for help.  •If someone responds, tell that person to call local emergency services (911 in [place] and look for an automated external defibrillator (AED).      •If no one responds, begin 2 minutes of CPR.        2.Make sure the person is lying on a firm, flat surface, facing up. Begin CPR, starting with 30 chest compressions and 2 breaths. Every time you open the airway to give rescue breaths, open the person's mouth. If you can see the food or object and it can be easily pulled out, remove it with your fingers. Do not try to remove the food or object if you cannot see it so you do not push it farther into the airway.      3.After 5 cycles or 2 minutes of CPR, call local emergency services (704 in [place] if a call has not already been made.      4.Continue CPR until the person starts breathing or until help arrives.        If you are chokin.Call local emergency services (274 in [place] Do not worry about communicating what is happening. Do not hang up the phone. Someone may be sent to help you anyway.      2.Perform the Heimlich maneuver on yourself. To do this, hold a fist against your abdomen and bend over a hard surface, such as a chair. Forcefully push your fist in and up until the food or object comes out.      Prevention    •Chew food thoroughly.      •Know that older adults are at an increased risk of choking. They should chew smaller bites and cut their food into smaller portions.      •Avoid talking or laughing while you are chewing and swallowing.      To be prepared if choking occurs, take a certified first-aid course to learn how to correctly perform the Heimlich maneuver.      Contact a health care provider if:    •You have trouble swallowing food.      •You continue to have drooling after choking.      •You have persistent chest pain after choking.        Get help right away if:    •You have problems breathing after choking stops.      •You are confused, persistently drowsy, or have lost consciousness at any point.      •You were given CPR.      These symptoms may represent a serious problem that is an emergency. Do not wait to see if the symptoms will go away. Get medical help right away. Call your local emergency services (775 in the [place] Do not drive yourself to the hospital.       Summary    •Choking occurs when a food or object gets stuck in the throat (trachea) and blocks the airway. This prevents breathing and can lead to respiratory arrest and even death if untreated.      •If a person has a partial airway blockage and is able to talk and cough, do not interfere and do not allow the person to drink until the food or object comes out.      •If a person has a complete airway blockage, perform the Heimlich maneuver. Call local emergency services and take the person to a health care provider afterward especially if CPR was done.      •If the person is unconscious and not breathing, call local emergency services and perform CPR until the person breathes normally or until help arrives.      This information is not intended to replace advice given to you by your health care provider. Make sure you discuss any questions you have with your health care provider.

## 2021-09-15 NOTE — ED PROVIDER NOTE - PATIENT PORTAL LINK FT
You can access the FollowMyHealth Patient Portal offered by [facility] by registering at the following website: [email or web address]. By joining [employer]’s FollowMyHealth portal, you will also be able to view your health information using other applications (apps) compatible with our system.

## 2021-09-15 NOTE — ED ADULT TRIAGE NOTE - CHIEF COMPLAINT QUOTE
was choking on chicken and rice and resolved no heimlich dine no cyanosis with event sent to be checked

## 2021-11-12 ENCOUNTER — EMERGENCY (EMERGENCY)
Facility: HOSPITAL | Age: 71
LOS: 1 days | Discharge: HOME | End: 2021-11-12
Attending: EMERGENCY MEDICINE | Admitting: EMERGENCY MEDICINE
Payer: MEDICARE

## 2021-11-12 VITALS
SYSTOLIC BLOOD PRESSURE: 128 MMHG | OXYGEN SATURATION: 99 % | HEART RATE: 68 BPM | TEMPERATURE: 99 F | DIASTOLIC BLOOD PRESSURE: 72 MMHG | RESPIRATION RATE: 16 BRPM

## 2021-11-12 VITALS
HEIGHT: 63 IN | DIASTOLIC BLOOD PRESSURE: 50 MMHG | SYSTOLIC BLOOD PRESSURE: 98 MMHG | HEART RATE: 74 BPM | TEMPERATURE: 97 F | OXYGEN SATURATION: 97 % | RESPIRATION RATE: 16 BRPM

## 2021-11-12 LAB
ALBUMIN SERPL ELPH-MCNC: 3.3 G/DL — SIGNIFICANT CHANGE UP (ref 3.3–5)
ALP SERPL-CCNC: 81 U/L — SIGNIFICANT CHANGE UP (ref 40–120)
ALT FLD-CCNC: 24 U/L — SIGNIFICANT CHANGE UP (ref 12–78)
ANION GAP SERPL CALC-SCNC: 4 MMOL/L — LOW (ref 5–17)
APPEARANCE UR: ABNORMAL
APTT BLD: 31.6 SEC — SIGNIFICANT CHANGE UP (ref 27.5–35.5)
AST SERPL-CCNC: 25 U/L — SIGNIFICANT CHANGE UP (ref 15–37)
BASOPHILS # BLD AUTO: 0.03 K/UL — SIGNIFICANT CHANGE UP (ref 0–0.2)
BASOPHILS NFR BLD AUTO: 0.5 % — SIGNIFICANT CHANGE UP (ref 0–2)
BILIRUB SERPL-MCNC: 0.5 MG/DL — SIGNIFICANT CHANGE UP (ref 0.2–1.2)
BILIRUB UR-MCNC: NEGATIVE — SIGNIFICANT CHANGE UP
BUN SERPL-MCNC: 24 MG/DL — HIGH (ref 7–23)
CALCIUM SERPL-MCNC: 9 MG/DL — SIGNIFICANT CHANGE UP (ref 8.5–10.1)
CHLORIDE SERPL-SCNC: 109 MMOL/L — HIGH (ref 96–108)
CO2 SERPL-SCNC: 29 MMOL/L — SIGNIFICANT CHANGE UP (ref 22–31)
COLOR SPEC: YELLOW — SIGNIFICANT CHANGE UP
CREAT SERPL-MCNC: 0.82 MG/DL — SIGNIFICANT CHANGE UP (ref 0.5–1.3)
DIFF PNL FLD: NEGATIVE — SIGNIFICANT CHANGE UP
EOSINOPHIL # BLD AUTO: 0.04 K/UL — SIGNIFICANT CHANGE UP (ref 0–0.5)
EOSINOPHIL NFR BLD AUTO: 0.7 % — SIGNIFICANT CHANGE UP (ref 0–6)
GLUCOSE SERPL-MCNC: 100 MG/DL — HIGH (ref 70–99)
GLUCOSE UR QL: NEGATIVE — SIGNIFICANT CHANGE UP
HCT VFR BLD CALC: 35.4 % — SIGNIFICANT CHANGE UP (ref 34.5–45)
HGB BLD-MCNC: 11.7 G/DL — SIGNIFICANT CHANGE UP (ref 11.5–15.5)
IMM GRANULOCYTES NFR BLD AUTO: 0.4 % — SIGNIFICANT CHANGE UP (ref 0–1.5)
INR BLD: 1.11 RATIO — SIGNIFICANT CHANGE UP (ref 0.88–1.16)
KETONES UR-MCNC: NEGATIVE — SIGNIFICANT CHANGE UP
LACTATE SERPL-SCNC: 0.7 MMOL/L — SIGNIFICANT CHANGE UP (ref 0.7–2)
LEUKOCYTE ESTERASE UR-ACNC: ABNORMAL
LYMPHOCYTES # BLD AUTO: 0.87 K/UL — LOW (ref 1–3.3)
LYMPHOCYTES # BLD AUTO: 15.7 % — SIGNIFICANT CHANGE UP (ref 13–44)
MCHC RBC-ENTMCNC: 30.2 PG — SIGNIFICANT CHANGE UP (ref 27–34)
MCHC RBC-ENTMCNC: 33.1 GM/DL — SIGNIFICANT CHANGE UP (ref 32–36)
MCV RBC AUTO: 91.2 FL — SIGNIFICANT CHANGE UP (ref 80–100)
MONOCYTES # BLD AUTO: 0.59 K/UL — SIGNIFICANT CHANGE UP (ref 0–0.9)
MONOCYTES NFR BLD AUTO: 10.6 % — SIGNIFICANT CHANGE UP (ref 2–14)
NEUTROPHILS # BLD AUTO: 4 K/UL — SIGNIFICANT CHANGE UP (ref 1.8–7.4)
NEUTROPHILS NFR BLD AUTO: 72.1 % — SIGNIFICANT CHANGE UP (ref 43–77)
NITRITE UR-MCNC: NEGATIVE — SIGNIFICANT CHANGE UP
NRBC # BLD: 0 /100 WBCS — SIGNIFICANT CHANGE UP (ref 0–0)
PH UR: 6 — SIGNIFICANT CHANGE UP (ref 5–8)
PLATELET # BLD AUTO: 171 K/UL — SIGNIFICANT CHANGE UP (ref 150–400)
POTASSIUM SERPL-MCNC: 4.4 MMOL/L — SIGNIFICANT CHANGE UP (ref 3.5–5.3)
POTASSIUM SERPL-SCNC: 4.4 MMOL/L — SIGNIFICANT CHANGE UP (ref 3.5–5.3)
PROT SERPL-MCNC: 6.9 G/DL — SIGNIFICANT CHANGE UP (ref 6–8.3)
PROT UR-MCNC: 15
PROTHROM AB SERPL-ACNC: 12.9 SEC — SIGNIFICANT CHANGE UP (ref 10.6–13.6)
RBC # BLD: 3.88 M/UL — SIGNIFICANT CHANGE UP (ref 3.8–5.2)
RBC # FLD: 12.2 % — SIGNIFICANT CHANGE UP (ref 10.3–14.5)
SODIUM SERPL-SCNC: 142 MMOL/L — SIGNIFICANT CHANGE UP (ref 135–145)
SP GR SPEC: 1.02 — SIGNIFICANT CHANGE UP (ref 1.01–1.02)
UROBILINOGEN FLD QL: NEGATIVE — SIGNIFICANT CHANGE UP
WBC # BLD: 5.55 K/UL — SIGNIFICANT CHANGE UP (ref 3.8–10.5)
WBC # FLD AUTO: 5.55 K/UL — SIGNIFICANT CHANGE UP (ref 3.8–10.5)

## 2021-11-12 PROCEDURE — 93971 EXTREMITY STUDY: CPT

## 2021-11-12 PROCEDURE — 36415 COLL VENOUS BLD VENIPUNCTURE: CPT

## 2021-11-12 PROCEDURE — 99284 EMERGENCY DEPT VISIT MOD MDM: CPT

## 2021-11-12 PROCEDURE — 85025 COMPLETE CBC W/AUTO DIFF WBC: CPT

## 2021-11-12 PROCEDURE — 87040 BLOOD CULTURE FOR BACTERIA: CPT

## 2021-11-12 PROCEDURE — G1004: CPT

## 2021-11-12 PROCEDURE — 85730 THROMBOPLASTIN TIME PARTIAL: CPT

## 2021-11-12 PROCEDURE — 70450 CT HEAD/BRAIN W/O DYE: CPT | Mod: ME

## 2021-11-12 PROCEDURE — 85610 PROTHROMBIN TIME: CPT

## 2021-11-12 PROCEDURE — 93971 EXTREMITY STUDY: CPT | Mod: 26,LT

## 2021-11-12 PROCEDURE — 80053 COMPREHEN METABOLIC PANEL: CPT

## 2021-11-12 PROCEDURE — 99284 EMERGENCY DEPT VISIT MOD MDM: CPT | Mod: 25

## 2021-11-12 PROCEDURE — 70450 CT HEAD/BRAIN W/O DYE: CPT | Mod: 26,ME

## 2021-11-12 PROCEDURE — 87086 URINE CULTURE/COLONY COUNT: CPT

## 2021-11-12 PROCEDURE — 81001 URINALYSIS AUTO W/SCOPE: CPT

## 2021-11-12 PROCEDURE — 83605 ASSAY OF LACTIC ACID: CPT

## 2021-11-12 PROCEDURE — 96365 THER/PROPH/DIAG IV INF INIT: CPT

## 2021-11-12 RX ORDER — CEPHALEXIN 500 MG
1 CAPSULE ORAL
Qty: 20 | Refills: 0
Start: 2021-11-12 | End: 2021-11-16

## 2021-11-12 RX ORDER — ROBINUL 0.2 MG/ML
1 INJECTION INTRAMUSCULAR; INTRAVENOUS ONCE
Refills: 0 | Status: DISCONTINUED | OUTPATIENT
Start: 2021-11-12 | End: 2021-11-12

## 2021-11-12 RX ADMIN — Medication 100 MILLIGRAM(S): at 11:34

## 2021-11-12 RX ADMIN — Medication 900 MILLIGRAM(S): at 12:09

## 2021-11-12 NOTE — ED PROVIDER NOTE - CLINICAL SUMMARY MEDICAL DECISION MAKING FREE TEXT BOX
71 F with dementia sent for left arm swelling - left hand/arm swelling, areas of erythema/exoriations to upper arm; face does not appear swollen - did get booster (unknown laterality) 4 days ago - abx (?cellulitis), check labs, imaging, reassess

## 2021-11-12 NOTE — ED PROVIDER NOTE - MUSCULOSKELETAL, MLM
L arm and hand with swelling, nontender, holding digits 3-5 in flexed position but able to open them, no signs of injury, radial pulse palpable, cap refill <2sec, few areas of erythema and excoriations noted to upper arm, noted to be using both hands; RUE unremarkable

## 2021-11-12 NOTE — ED PROVIDER NOTE - OBJECTIVE STATEMENT
71 F with dementia sent from [facility] for evaluation of left arm swelling noticed this AM. Pt unable to provide history 2/2 dementia but does deny having pain.

## 2021-11-12 NOTE — ED PROVIDER NOTE - CARE PROVIDER_API CALL
Raoul Richter)  Internal Medicine  [address] [place] [id number]  Phone: [phone]  Fax: [phone]  Follow Up Time:

## 2021-11-12 NOTE — ED PROVIDER NOTE - ATTENDING CONTRIBUTION TO CARE
70 yo F p/w sent in by EMS for L arm / hand swelling. ?? facial swelling. No fall / recent trauma. NO evid of pain. Pt with some dec activity today then usual, otherwise no change in mental status. no v/d. No known recent trauma. Pt vaccinated for covid. no other hx avail.  exam: MM Moist.  neck supple. no meningeal signs. No ext signs of head / truncal trauma. Mild edema to dist L arm / hand. No erythema, no warmth. FROm all joints (hand / wrist / shoulder) with no acute pain. nl cap refill, 2 + pulses. no facial edema / erythema / warmth. non-tender. No other acute findings on exam. CTA bl, no w/r/r. NL non-focal neuro exam.  check labs, CT, US, IV, reeval

## 2021-11-12 NOTE — ED ADULT NURSE REASSESSMENT NOTE - NS ED NURSE REASSESS COMMENT FT1
Received patient from [facility] RN lying in bed waiting for disposition lying in bed safety precaution maintained O2 provided via nasal cannula.

## 2021-11-12 NOTE — ED PROVIDER NOTE - NSFOLLOWUPINSTRUCTIONS_ED_ALL_ED_FT
Keflex four times a day as prescribed.  Prevent scratching of arm.  Keep arm elevated.  Follow up with Dr. Richter.  Return to the Emergency Department for worsening or concerning symptoms.

## 2021-11-12 NOTE — ED ADULT NURSE NOTE - OBJECTIVE STATEMENT
Received pt from [facility] pt has some left sided drooping and left arm swelling pt is able to move the left arm has some red scratch marks on the arm and moves her hand spontaneously. Pt is not talking. Pt shows no s/s of acute distress pt wince with pain from putting in IV otherwise no pain indicators. Safety/Comfort maintained. Will continue to monitor. Freq rounding performed. Call bell within reach.

## 2021-11-12 NOTE — ED PROVIDER NOTE - PROGRESS NOTE DETAILS
[facility] [phone]  Spoke with Annie who states noticed arm/hand swelling this morning, left face seemed swollen too. Also pt usually very active/ambulatory but today less active. States is verbal at times but also normal for her to not talk much. Discussed with Dr. Richter who is agreeable with plan. States can send keflex for possible cellulitis.   Pt more active, in no acute distress.   Spoke with alejandro [facility] regarding work up and plan and discharge. Prescription sent to [facility].    ?vaccine reaction ?cellulitis

## 2021-11-12 NOTE — ED PROVIDER NOTE - PATIENT PORTAL LINK FT
You can access the FollowMyHealth Patient Portal offered by [facility] by registering at the following website: [email or web address]. By joining [employer]’s FollowMyHealth portal, you will also be able to view your health information using other applications (apps) compatible with our system.

## 2021-11-14 LAB
CULTURE RESULTS: NO GROWTH — SIGNIFICANT CHANGE UP
SPECIMEN SOURCE: SIGNIFICANT CHANGE UP

## 2021-11-17 LAB
CULTURE RESULTS: SIGNIFICANT CHANGE UP
CULTURE RESULTS: SIGNIFICANT CHANGE UP
SPECIMEN SOURCE: SIGNIFICANT CHANGE UP
SPECIMEN SOURCE: SIGNIFICANT CHANGE UP

## 2021-11-26 ENCOUNTER — EMERGENCY (EMERGENCY)
Facility: HOSPITAL | Age: 71
LOS: 1 days | Discharge: HOME | End: 2021-11-26
Attending: EMERGENCY MEDICINE | Admitting: EMERGENCY MEDICINE
Payer: MEDICARE

## 2021-11-26 VITALS
HEART RATE: 78 BPM | RESPIRATION RATE: 18 BRPM | DIASTOLIC BLOOD PRESSURE: 80 MMHG | OXYGEN SATURATION: 98 % | SYSTOLIC BLOOD PRESSURE: 132 MMHG

## 2021-11-26 VITALS
TEMPERATURE: 98 F | HEIGHT: 63 IN | SYSTOLIC BLOOD PRESSURE: 99 MMHG | HEART RATE: 71 BPM | RESPIRATION RATE: 16 BRPM | WEIGHT: 121.92 LBS | DIASTOLIC BLOOD PRESSURE: 57 MMHG | OXYGEN SATURATION: 96 %

## 2021-11-26 LAB
ALBUMIN SERPL ELPH-MCNC: 3.8 G/DL — SIGNIFICANT CHANGE UP (ref 3.3–5)
ALP SERPL-CCNC: 87 U/L — SIGNIFICANT CHANGE UP (ref 40–120)
ALT FLD-CCNC: 18 U/L — SIGNIFICANT CHANGE UP (ref 12–78)
AMMONIA BLD-MCNC: <17 UMOL/L — SIGNIFICANT CHANGE UP (ref 11–32)
ANION GAP SERPL CALC-SCNC: 3 MMOL/L — LOW (ref 5–17)
APPEARANCE UR: CLEAR — SIGNIFICANT CHANGE UP
APTT BLD: 31 SEC — SIGNIFICANT CHANGE UP (ref 27.5–35.5)
AST SERPL-CCNC: 14 U/L — LOW (ref 15–37)
BASOPHILS # BLD AUTO: 0.04 K/UL — SIGNIFICANT CHANGE UP (ref 0–0.2)
BASOPHILS NFR BLD AUTO: 0.6 % — SIGNIFICANT CHANGE UP (ref 0–2)
BILIRUB SERPL-MCNC: 0.3 MG/DL — SIGNIFICANT CHANGE UP (ref 0.2–1.2)
BILIRUB UR-MCNC: NEGATIVE — SIGNIFICANT CHANGE UP
BUN SERPL-MCNC: 23 MG/DL — SIGNIFICANT CHANGE UP (ref 7–23)
CALCIUM SERPL-MCNC: 9.4 MG/DL — SIGNIFICANT CHANGE UP (ref 8.5–10.1)
CHLORIDE SERPL-SCNC: 110 MMOL/L — HIGH (ref 96–108)
CO2 SERPL-SCNC: 30 MMOL/L — SIGNIFICANT CHANGE UP (ref 22–31)
COLOR SPEC: YELLOW — SIGNIFICANT CHANGE UP
CREAT SERPL-MCNC: 0.79 MG/DL — SIGNIFICANT CHANGE UP (ref 0.5–1.3)
DIFF PNL FLD: NEGATIVE — SIGNIFICANT CHANGE UP
EOSINOPHIL # BLD AUTO: 0.04 K/UL — SIGNIFICANT CHANGE UP (ref 0–0.5)
EOSINOPHIL NFR BLD AUTO: 0.6 % — SIGNIFICANT CHANGE UP (ref 0–6)
GLUCOSE SERPL-MCNC: 108 MG/DL — HIGH (ref 70–99)
GLUCOSE UR QL: NEGATIVE — SIGNIFICANT CHANGE UP
HCT VFR BLD CALC: 37.1 % — SIGNIFICANT CHANGE UP (ref 34.5–45)
HGB BLD-MCNC: 12.4 G/DL — SIGNIFICANT CHANGE UP (ref 11.5–15.5)
IMM GRANULOCYTES NFR BLD AUTO: 0.3 % — SIGNIFICANT CHANGE UP (ref 0–1.5)
INR BLD: 1.16 RATIO — SIGNIFICANT CHANGE UP (ref 0.88–1.16)
KETONES UR-MCNC: NEGATIVE — SIGNIFICANT CHANGE UP
LACTATE SERPL-SCNC: 1.1 MMOL/L — SIGNIFICANT CHANGE UP (ref 0.7–2)
LEUKOCYTE ESTERASE UR-ACNC: NEGATIVE — SIGNIFICANT CHANGE UP
LYMPHOCYTES # BLD AUTO: 0.8 K/UL — LOW (ref 1–3.3)
LYMPHOCYTES # BLD AUTO: 11.9 % — LOW (ref 13–44)
MCHC RBC-ENTMCNC: 30.5 PG — SIGNIFICANT CHANGE UP (ref 27–34)
MCHC RBC-ENTMCNC: 33.4 GM/DL — SIGNIFICANT CHANGE UP (ref 32–36)
MCV RBC AUTO: 91.2 FL — SIGNIFICANT CHANGE UP (ref 80–100)
MONOCYTES # BLD AUTO: 0.41 K/UL — SIGNIFICANT CHANGE UP (ref 0–0.9)
MONOCYTES NFR BLD AUTO: 6.1 % — SIGNIFICANT CHANGE UP (ref 2–14)
NEUTROPHILS # BLD AUTO: 5.42 K/UL — SIGNIFICANT CHANGE UP (ref 1.8–7.4)
NEUTROPHILS NFR BLD AUTO: 80.5 % — HIGH (ref 43–77)
NITRITE UR-MCNC: NEGATIVE — SIGNIFICANT CHANGE UP
NRBC # BLD: 0 /100 WBCS — SIGNIFICANT CHANGE UP (ref 0–0)
PH UR: 6 — SIGNIFICANT CHANGE UP (ref 5–8)
PLATELET # BLD AUTO: 232 K/UL — SIGNIFICANT CHANGE UP (ref 150–400)
POTASSIUM SERPL-MCNC: 4 MMOL/L — SIGNIFICANT CHANGE UP (ref 3.5–5.3)
POTASSIUM SERPL-SCNC: 4 MMOL/L — SIGNIFICANT CHANGE UP (ref 3.5–5.3)
PROT SERPL-MCNC: 7.9 G/DL — SIGNIFICANT CHANGE UP (ref 6–8.3)
PROT UR-MCNC: NEGATIVE — SIGNIFICANT CHANGE UP
PROTHROM AB SERPL-ACNC: 13.5 SEC — SIGNIFICANT CHANGE UP (ref 10.6–13.6)
RBC # BLD: 4.07 M/UL — SIGNIFICANT CHANGE UP (ref 3.8–5.2)
RBC # FLD: 12.2 % — SIGNIFICANT CHANGE UP (ref 10.3–14.5)
SARS-COV-2 RNA SPEC QL NAA+PROBE: SIGNIFICANT CHANGE UP
SODIUM SERPL-SCNC: 143 MMOL/L — SIGNIFICANT CHANGE UP (ref 135–145)
SP GR SPEC: 1.02 — SIGNIFICANT CHANGE UP (ref 1.01–1.02)
UROBILINOGEN FLD QL: NEGATIVE — SIGNIFICANT CHANGE UP
WBC # BLD: 6.73 K/UL — SIGNIFICANT CHANGE UP (ref 3.8–10.5)
WBC # FLD AUTO: 6.73 K/UL — SIGNIFICANT CHANGE UP (ref 3.8–10.5)

## 2021-11-26 PROCEDURE — 83605 ASSAY OF LACTIC ACID: CPT

## 2021-11-26 PROCEDURE — 99284 EMERGENCY DEPT VISIT MOD MDM: CPT | Mod: 25

## 2021-11-26 PROCEDURE — 70450 CT HEAD/BRAIN W/O DYE: CPT | Mod: 26,MA

## 2021-11-26 PROCEDURE — 87086 URINE CULTURE/COLONY COUNT: CPT

## 2021-11-26 PROCEDURE — 71045 X-RAY EXAM CHEST 1 VIEW: CPT

## 2021-11-26 PROCEDURE — 36415 COLL VENOUS BLD VENIPUNCTURE: CPT

## 2021-11-26 PROCEDURE — 93010 ELECTROCARDIOGRAM REPORT: CPT

## 2021-11-26 PROCEDURE — 85730 THROMBOPLASTIN TIME PARTIAL: CPT

## 2021-11-26 PROCEDURE — 82140 ASSAY OF AMMONIA: CPT

## 2021-11-26 PROCEDURE — 85025 COMPLETE CBC W/AUTO DIFF WBC: CPT

## 2021-11-26 PROCEDURE — 70450 CT HEAD/BRAIN W/O DYE: CPT | Mod: MA

## 2021-11-26 PROCEDURE — 81003 URINALYSIS AUTO W/O SCOPE: CPT

## 2021-11-26 PROCEDURE — 87040 BLOOD CULTURE FOR BACTERIA: CPT

## 2021-11-26 PROCEDURE — 93005 ELECTROCARDIOGRAM TRACING: CPT

## 2021-11-26 PROCEDURE — 85610 PROTHROMBIN TIME: CPT

## 2021-11-26 PROCEDURE — 87635 SARS-COV-2 COVID-19 AMP PRB: CPT

## 2021-11-26 PROCEDURE — 80053 COMPREHEN METABOLIC PANEL: CPT

## 2021-11-26 PROCEDURE — 71045 X-RAY EXAM CHEST 1 VIEW: CPT | Mod: 26

## 2021-11-26 PROCEDURE — 99284 EMERGENCY DEPT VISIT MOD MDM: CPT

## 2021-11-26 RX ORDER — SODIUM CHLORIDE 9 MG/ML
1700 INJECTION INTRAMUSCULAR; INTRAVENOUS; SUBCUTANEOUS ONCE
Refills: 0 | Status: COMPLETED | OUTPATIENT
Start: 2021-11-26 | End: 2021-11-26

## 2021-11-26 RX ADMIN — SODIUM CHLORIDE 1700 MILLILITER(S): 9 INJECTION INTRAMUSCULAR; INTRAVENOUS; SUBCUTANEOUS at 16:29

## 2021-11-26 NOTE — ED PROVIDER NOTE - NSFOLLOWUPINSTRUCTIONS_ED_ALL_ED_FT
YOU MAY RESUME YOUR USUAL MEDICATIONS  FOLLOW UP WITH DR SANFORD FOR RE-EVALUATION  RETURN FOR ANY CONCERNS FEVER OR WORSENING SYMPTOMS

## 2021-11-26 NOTE — ED PROVIDER NOTE - CONSTITUTIONAL, MLM
normal... Well appearing, awake, alert, not verbal other than one or two word sentences, able to explain that the mask hurts her ear and in no apparent distress.

## 2021-11-26 NOTE — ED PROVIDER NOTE - CLINICAL SUMMARY MEDICAL DECISION MAKING FREE TEXT BOX
elderly female with dementia sent from assisted living facility for eval of increased confusion  ro covid ro dehydration ro uti ro trauma ro pneumonia ro hyperammonemia  labs ct xray ua covid iv hydration  dw pmd

## 2021-11-26 NOTE — ED PROVIDER NOTE - CARE PROVIDER_API CALL
Tristan Richter)  Critical Care Medicine; Internal Medicine; Pulmonary Disease  [address] [place] [address]  Phone: [phone]  Fax: [phone]  Follow Up Time:

## 2021-11-26 NOTE — ED ADULT NURSE NOTE - NSICDXPASTSURGICALHX_GEN_ALL_CORE_FT
21 yo f at 10 weeks gestation p/w lower abdominal cramping for 1 day.  Notes occasional n/v, no bleeding, no changes in vaginal discharge.  No fevers, chills, chest pain, sob, leg swelling.
PAST SURGICAL HISTORY:  No significant past surgical history

## 2021-11-26 NOTE — ED ADULT TRIAGE NOTE - BP NONINVASIVE DIASTOLIC (MM HG)
Pt was discharged and given instructions by NP. Pt verbalized good understanding of all discharge instructions,prescriptions and F/U care. All questions answered. Pt in stable condition on discharge.
57

## 2021-11-26 NOTE — ED ADULT NURSE NOTE - NSIMPLEMENTINTERV_GEN_ALL_ED
Implemented All Fall with Harm Risk Interventions:  [place] to call system. Call bell, personal items and telephone within reach. Instruct patient to call for assistance. Room bathroom lighting operational. Non-slip footwear when patient is off stretcher. Physically safe environment: no spills, clutter or unnecessary equipment. Stretcher in lowest position, wheels locked, appropriate side rails in place. Provide visual cue, wrist band, yellow gown, etc. Monitor gait and stability. Monitor for mental status changes and reorient to person, place, and time. Review medications for side effects contributing to fall risk. Reinforce activity limits and safety measures with patient and family. Provide visual clues: red socks.

## 2021-11-26 NOTE — ED PROVIDER NOTE - OBJECTIVE STATEMENT
pt is a 70 yo f who has dementia is resident of [facility] pmd dr Tristan Richter hypothyroid neuro cognitive disorder, currently on keflex per mar sent with pt  and pt is a 72 yo f who has dementia is resident of [facility] pmd dr Tristan Richter hypothyroid neuro cognitive disorder, currently on keflex per mar sent with pt  and on transfer notes, pt "more lethargic' and tired with weakness"  pt is poor historian

## 2021-11-26 NOTE — ED ADULT NURSE REASSESSMENT NOTE - NSIMPLEMENTINTERV_GEN_ALL_ED
Implemented All Fall with Harm Risk Interventions:  [place] to call system. Call bell, personal items and telephone within reach. Instruct patient to call for assistance. Room bathroom lighting operational. Non-slip footwear when patient is off stretcher. Physically safe environment: no spills, clutter or unnecessary equipment. Stretcher in lowest position, wheels locked, appropriate side rails in place. Provide visual cue, wrist band, yellow gown, etc. Monitor gait and stability. Monitor for mental status changes and reorient to person, place, and time. Review medications for side effects contributing to fall risk. Reinforce activity limits and safety measures with patient and family. Provide visual clues: red socks.

## 2021-11-26 NOTE — ED ADULT NURSE REASSESSMENT NOTE - NS ED NURSE REASSESS COMMENT FT1
Pt remains AAO to self. Appears to be in no distress at this time. Pt VSS, awaiting for CT SCAN prior to dc back to nursing facility

## 2021-11-26 NOTE — ED ADULT TRIAGE NOTE - NS ED NURSE BANDS TYPE
Alex Willingham M.D.  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 EGD DISCHARGE INSTRUCTIONS    DISCOMFORT:  Sore throat- throat lozenges or warm salt water gargle  redness at IV site- apply warm compress to area; if redness or soreness persist- contact your physician  Gaseous discomfort- walking, belching will help relieve any discomfort  You may not operate a vehicle for 12 hours  You may not engage in an occupation involving machinery or appliances for rest of today  You may not drink alcoholic beverages for at least 12 hours  Avoid making any critical decisions for at least 24 hour    DIET:   You may resume your regular diet. ACTIVITY  Spend the remainder of the day resting -  avoid any strenuous activity. Avoid driving or operating machinery. CALL M.D. ANY SIGN OF   Increasing pain, nausea, vomiting  Abdominal distension (swelling)  New increased bleeding (oral or rectal)  Fever (chills)  Pain in chest area  Bloody discharge from nose or mouth  Shortness of breath    Follow-up Instructions:   Call Dr. Juanita Guy for any questions or problems. Telephone # [phone]  Continue to hold clopidogrel until next Monday and have labs done at Dr. Hinds [facility] office. Will need stent replacement in 2 to 3 months, this will depend on response to therapy which will dictate stent type.
Name band;

## 2021-11-26 NOTE — ED ADULT NURSE NOTE - NSNUTRITIONRISK_GI_A_CORE
Patient has history of COVID-19, however is currently asymptomatic  He has also received his COVID-19 vaccination  Long-term affects of COVID education provided  No indicators present

## 2021-11-26 NOTE — ED PROVIDER NOTE - PATIENT PORTAL LINK FT
You can access the FollowMyHealth Patient Portal offered by [facility] by registering at the following website: [email or web address]. By joining [employer]’s FollowMyHealth portal, you will also be able to view your health information using other applications (apps) compatible with our system.

## 2021-11-27 LAB
CULTURE RESULTS: NO GROWTH — SIGNIFICANT CHANGE UP
SPECIMEN SOURCE: SIGNIFICANT CHANGE UP

## 2021-11-29 NOTE — ED ADULT NURSE NOTE - SKIN INTEGRITY
intact Dizziness    WHAT YOU NEED TO KNOW:    Dizziness is a feeling of being off balance or unsteady. Common causes of dizziness are an inner ear fluid imbalance or a lack of oxygen in your blood. Dizziness may be acute (lasts 3 days or less) or chronic (lasts longer than 3 days). You may have dizzy spells that last from seconds to a few hours.     DISCHARGE INSTRUCTIONS:    Return to the emergency department if:   •You have a headache and a stiff neck.      •You have shaking chills and a fever.       •You vomit over and over with no relief.       •Your vomit or bowel movements are red or black.       •You have pain in your chest, back, or abdomen.       •You have numbness, especially in your face, arms, or legs.       •You have trouble moving your arms or legs.       •You are confused.       Contact your healthcare provider if:   •You have a fever.       •Your symptoms do not get better with treatment.       •You have questions or concerns about your condition or care.       Manage your symptoms:   •Do not drive or operate heavy machinery when you are dizzy.       •Get up slowly from sitting or lying down.       •Drink plenty of liquids. Liquids help prevent dehydration. Ask how much liquid to drink each day and which liquids are best for you.      Follow up with your doctor as directed: Write down your questions so you remember to ask them during your visits.         Benign Paroxysmal Positional Vertigo    WHAT YOU NEED TO KNOW:    BPPV is an inner ear condition that causes you to suddenly feel dizzy. Benign means it is not serious or life-threatening. BPPV is caused by a problem with the nerves and structure of your inner ear. BPPV happens when small pieces of calcium break loose and lump together in one of your inner ear canals.     Ear Anatomy         DISCHARGE INSTRUCTIONS:    Return to the emergency department if:   •You fall during a BPPV episode and are injured.      •You have a severe headache that does not go away.      •You have new changes in your vision or feel weak or confused.      •You have problems hearing, or you have ringing or buzzing in your ears.      Contact your healthcare provider if:   •Your BPPV symptoms do not go away or they return.      •You have problems with your balance, or you are falling often.      •You have new or increased nausea or vomiting with vertigo.      •You feel anxious or depressed and do not want to leave your home.      •You have questions or concerns about your condition or care.      Medicines:   •Medicines may be recommended or prescribed to treat dizziness or nausea.      •Take your medicine as directed. Contact your healthcare provider if you think your medicine is not helping or if you have side effects. Tell him of her if you are allergic to any medicine. Keep a list of the medicines, vitamins, and herbs you take. Include the amounts, and when and why you take them. Bring the list or the pill bottles to follow-up visits. Carry your medicine list with you in case of an emergency.      Prevent your symptoms:   •Try to avoid sudden head movements. Stand up and lie down slowly.       •Raise and support your head when you lie down. Place pillows under your upper back and head or rest in a recliner.       •Change your position often when you are lying down. Try not to lie with your head on the same side for long periods of time. Roll over slowly.       •Wear protective gear when you ride a bike or play sports. A helmet helps protect your head from injury.      Follow up with your healthcare provider as directed: You may need to return in 1 month to check the progress of your treatment. Write down your questions so you remember to ask them during your visits.

## 2022-02-07 ENCOUNTER — EMERGENCY (EMERGENCY)
Facility: HOSPITAL | Age: 72
LOS: 1 days | Discharge: NURSING, REHAB OR LONG-TERM CARE | End: 2022-02-07
Attending: EMERGENCY MEDICINE | Admitting: EMERGENCY MEDICINE
Payer: MEDICARE

## 2022-02-07 VITALS
WEIGHT: 117.95 LBS | OXYGEN SATURATION: 97 % | DIASTOLIC BLOOD PRESSURE: 70 MMHG | HEIGHT: 63 IN | HEART RATE: 64 BPM | RESPIRATION RATE: 17 BRPM | TEMPERATURE: 98 F | SYSTOLIC BLOOD PRESSURE: 104 MMHG

## 2022-02-07 VITALS
DIASTOLIC BLOOD PRESSURE: 66 MMHG | SYSTOLIC BLOOD PRESSURE: 116 MMHG | RESPIRATION RATE: 16 BRPM | TEMPERATURE: 98 F | OXYGEN SATURATION: 95 % | HEART RATE: 65 BPM

## 2022-02-07 PROCEDURE — 12001 RPR S/N/AX/GEN/TRNK 2.5CM/<: CPT

## 2022-02-07 PROCEDURE — 70450 CT HEAD/BRAIN W/O DYE: CPT | Mod: 26,MA

## 2022-02-07 PROCEDURE — 99284 EMERGENCY DEPT VISIT MOD MDM: CPT | Mod: 25

## 2022-02-07 PROCEDURE — 70450 CT HEAD/BRAIN W/O DYE: CPT | Mod: MA

## 2022-02-07 RX ORDER — DONEPEZIL HYDROCHLORIDE 10 MG/1
1 TABLET, FILM COATED ORAL
Qty: 0 | Refills: 0 | DISCHARGE

## 2022-02-07 RX ORDER — RISPERIDONE 4 MG/1
1 TABLET ORAL
Qty: 0 | Refills: 0 | DISCHARGE

## 2022-02-07 RX ORDER — ESCITALOPRAM OXALATE 10 MG/1
1 TABLET, FILM COATED ORAL
Qty: 0 | Refills: 0 | DISCHARGE

## 2022-02-07 RX ORDER — LANOLIN ALCOHOL/MO/W.PET/CERES
1 CREAM (GRAM) TOPICAL
Qty: 0 | Refills: 0 | DISCHARGE

## 2022-02-07 RX ORDER — LEVOTHYROXINE SODIUM 125 MCG
1 TABLET ORAL
Qty: 0 | Refills: 0 | DISCHARGE

## 2022-02-07 RX ORDER — DOCUSATE SODIUM 100 MG
1 CAPSULE ORAL
Qty: 0 | Refills: 0 | DISCHARGE

## 2022-02-07 RX ORDER — HALOPERIDOL DECANOATE 100 MG/ML
0 INJECTION INTRAMUSCULAR
Qty: 0 | Refills: 0 | DISCHARGE

## 2022-02-07 RX ORDER — ATORVASTATIN CALCIUM 80 MG/1
1 TABLET, FILM COATED ORAL
Qty: 0 | Refills: 0 | DISCHARGE

## 2022-02-07 NOTE — ED PROVIDER NOTE - PHYSICAL EXAMINATION
Gen: demented, nad  Head/eyes: NC/+left parietal scalp laceration 1cm, PERRL  ENT: airway patent  Neck: supple, no ttp  Pulm/lung: Bilateral clear BS  CV/heart: RRR  GI/Abd: soft, NT/ND  Musculoskeletal: no edema/erythema/cyanosis, FROM in all extremities, no C/T/L spine ttp  Skin: +left parietal scalp laceration 1cm, no vesicles, no petechaie, no ecchymosis, no swelling  Neuro: demented, moving all extremities

## 2022-02-07 NOTE — ED PROCEDURE NOTE - PROCEDURE NAME, MLM
[facility]    [address] [place] [address]    Phone:  [phone]           Jonh Tilley   MRN: [id number]    Department:  [facility]   Date of Visit:  4/27/2017           Diagnosis     Dentalgia        You were seen by Saloni Horn PA-C.      Disclaimer     Follow-up Care:  It is your responsibility to arrange for follow-up care with your healthcare provider or as instructed. Call to get an appointment time.           Contact your doctor for follow-up appointment if not already scheduled.     Follow up with a dentist.    Comments:  call for next available appointment      Preventive care and screening     Your blood pressure was 162/85 today. If your blood pressure is higher than 120/80, we recommend follow up with your primary care provider to obtain basic health screening, including reassessment of your blood pressure, within three months.          Medications you received while in the ED through 04/27/2017  4:46 PM     None         What to Do with Your Medications      ASK your doctor about these medications        Details    * clindamycin 300 MG capsule   Commonly known as:  CLEOCIN   What changed:  Another medication with the same name was added. Make sure you understand how and when to take each.   Ask about: Which instructions should I use?        Take 1 capsule by mouth 3 times daily.   Authorizing Provider:  Evin Pemberton       * clindamycin 150 MG capsule   Commonly known as:  CLEOCIN   What changed:  You were already taking a medication with the same name, and this prescription was added. Make sure you understand how and when to take each.   Ask about: Which instructions should I use?        Take 3 capsules by mouth 3 times daily for 10 days.   Authorizing Provider:  Saloni Horn       * ibuprofen 800 MG tablet   Commonly known as:  MOTRIN   What changed:  Another medication with the same name was added. Make sure you understand how and when to  take each.   Ask about: Which instructions should I use?        Take 1 tablet by mouth 3 times daily as needed for Pain.   Authorizing Provider:  Joss Orozco       * ibuprofen 600 MG tablet   Commonly known as:  MOTRIN   What changed:  You were already taking a medication with the same name, and this prescription was added. Make sure you understand how and when to take each.   Ask about: Which instructions should I use?        Take 1 tablet by mouth every 6 hours as needed for Pain.   Authorizing Provider:  Saloni Horn                        * Notice:  This list has 4 medication(s) that are the same as other medications prescribed for you. Read the directions carefully, and ask your doctor or other care provider to review them with you.         Where to Get Your Medications      You can get these medications from any pharmacy     Bring a paper prescription for each of these medications     clindamycin 150 MG capsule    ibuprofen 600 MG tablet               Your To Do List     Future Appointments Provider Department Dept Phone    5/18/2017 2:30 PM Mackenzie Garcia PA-C [facility] [phone]      Procedures     None      Imaging Results     None        Discharge Instructions         Dental Pain    A crack or cavity in a tooth can cause tooth pain. This is because the crack or cavity exposes the sensitive inner area of the tooth. An infection in the gum or the root of the tooth can cause pain and swelling. The pain is often made worse when you drink hot or cold beverages. It can also be worse when you bite on hard foods. Pain may spread from the tooth to your ear or the area of the jaw on the same side.  Home care  Follow these tips when caring for yourself at home:  · Avoid hot and cold foods and drinks. Your tooth may be sensitive to changes in temperature.  · Use toothpaste made for sensitive teeth. Brush up and down instead of sideways. Brushing sideways can wear away root surfaces if  they are exposed.  · If your tooth is chipped or cracked, or if there is a large open cavity, put oil of cloves directly on the tooth to relieve pain. You can buy oil of cloves at drugstores. Some pharmacies carry an over-the-counter \"toothache kit.\" This contains a paste that you can put on the exposed tooth to make it less sensitive.  · Put a cold pack on your jaw over the sore area to help reduce pain.  · You may use acetaminophen or ibuprofen to ease pain, unless another medicine was prescribed. Note: If you have chronic liver or kidney disease, talk with your health care provider before using these medicines. Also talk with your provider if you’ve had a stomach ulcer or GI bleeding.  · If you have signs of an infection, you will be given an antibiotic. Take it as directed.  Follow-up care  Follow up with your dentist as advised. Your pain may go away with the treatment given today. But only a dentist can fully look at and treat the cause of your pain. This will keep the pain from coming back.  A toothache is a sign of disease in your tooth. It should be looked at and treated by a dentist.  When to seek medical advice  Call your health care provider right away if any of these occur:  · Your face becomes swollen or red  · Pain gets worse or spreads to your neck  · Fever over 100.4º F (38.0º C)  · Unusual drowsiness  · Headache or stiff neck  · Weakness or fainting  · Pus drains from the tooth  · Difficulty swallowing or breathing     © 2500-8687 [employer]. [address] [place] [address], [place] [address]. All rights reserved. This information is not intended as a substitute for professional medical care. Always follow your healthcare professional's instructions.          Discharge References/Attachments     None      Medication Safety: What you need to know     Maintain Security - It is important to keep all medications in a secure location:  Keep out of the reach of children and pets    Consider using a  lock box or locked filing cabinet    Place pill bottles in private area such as bedroom or drawer    Don't Share - It is illegal to share your prescription medication, even with family:  The doctor prescribes medications specifically for you and your body    You cannot be sure how the drug may affect others physically or emotionally    It is a criminal offense to share prescriptions    Proper Disposal - It is no longer acceptable to flush or throw away medications:  Recent studies show measurable amounts of medication have been found in drinking water and wildlife due to flushing or throwing away medications    Medication strength changes over time and is not typically safe after one year    Proper disposal removes the medication from your home in a safe way so that others don't have access to it. Use your local drug drop site.    Your local pharmacy can provide information on medication disposal options in your community. The Department of [employer] Drug Enforcement Administration website also has information on safe medication disposal:  www.deadiversion.[facility].gov/drug_disposal/index.html         Laceration Repair

## 2022-02-07 NOTE — ED PROVIDER NOTE - CARE PLAN
Principal Discharge DX:	Scalp laceration  Secondary Diagnosis:	Head injury  Secondary Diagnosis:	Fall   1

## 2022-02-07 NOTE — ED PROVIDER NOTE - OBJECTIVE STATEMENT
72 yo female hx of dementia, BIBEMS s/p fall out of bed at nursing facility, +left sided scalp laceration, no anticoagulant use.  No nausea/vomiting.  Pt unable to provide further hx secondary to dementia.

## 2022-02-07 NOTE — ED ADULT NURSE NOTE - OBJECTIVE STATEMENT
71yr old female BIB EMS from [facility] c/o unwitnessed fall; laceration noted to left parietal scalp; pt awake and alert but not answering questions at this time; pt baseline mental status as per EMS; hx dementia 71yr old female BIB EMS from [facility] c/o unwitnessed fall; laceration noted to left parietal scalp; pt awake and alert but not answering questions at this time; pt baseline mental status as per EMS; hx dementia. Pt baseline is confused as per paper work from facility

## 2022-02-07 NOTE — ED PROVIDER NOTE - PATIENT PORTAL LINK FT
You can access the FollowMyHealth Patient Portal offered by [facility] by registering at the following website: [email or web address]. By joining [employer]’s FollowMyHealth portal, you will also be able to view your health information using other applications (apps) compatible with our system.

## 2022-02-07 NOTE — ED PROVIDER NOTE - NSFOLLOWUPINSTRUCTIONS_ED_ALL_ED_FT
1) Follow-up with your Primary Medical Doctor or referred doctor. Call today / next business day for prompt follow-up.  2) Return to Emergency room for any worsening or persistent pain, weakness, fever, or any other concerning symptoms.  3) See attached instruction sheets for additional information, including information regarding signs and symptoms to look out for, reasons to seek immediate care and other important instructions.  4) Follow-up with any specialists as discussed / noted as well.       WHAT YOU NEED TO KNOW:    Staples are often used to close a wound. Your staples may be placed for 3 to 14 days, depending on the location of your wound.    DISCHARGE INSTRUCTIONS:    Care for your wound:   •Clean: ?You may be able to shower in 24 hours. Do not soak your wound under water.      ?Gently wash your wound with soap and warm water daily. Lightly pat it dry. Do not cover your wound unless your healthcare provider tells you to.       ?You may also need to clean your wound with a mixture of hydrogen peroxide and water. Ask how to do this.      ?Do not apply ointment or cream to the wound unless your healthcare provider tells you to.      •Elevate: ?Rest any arm or leg that has a wound on pillows above the level of your heart. Do this as often as possible for 2 days. This will help decrease swelling and pain, and help you heal faster.          •Minimize scarring: ?Avoid sunshine on your wound to reduce scarring.             Follow up with your healthcare provider as directed: You may need to return for a wound checkup 3 days after your staples are placed. Ask when you should return to get your staples removed.    Staple removal:   •A medical staple remover will be used to take out your staples. Your healthcare provider will slide the tool under each staple, squeeze the handle, and gently pull the staple out.       •Medical tape will be placed on your wound once your staples are removed. This will help keep your wound closed. The medical tape will fall off on its own after several days.       Contact your healthcare provider if:   •You have redness, pain, swelling, and pus draining from your wound.      •Your pain medicine does not relieve your pain.      •You have a fever of 101°F (38.5°C) or higher.      •You have an odor coming from your wound.      •You have questions or concerns about your condition or care.      Return to the emergency department if:   •Your wound reopens.      •You have red streaks in your skin that spread out from your wound.      •You have severe pain or vomiting. 1) Return in 7-10 days for staple removal  2) Return to Emergency room for any worsening or persistent pain, weakness, fever, or any other concerning symptoms.  3) See attached instruction sheets for additional information, including information regarding signs and symptoms to look out for, reasons to seek immediate care and other important instructions.  4) Follow-up with any specialists as discussed / noted as well.       WHAT YOU NEED TO KNOW:    Staples are often used to close a wound. Your staples may be placed for 3 to 14 days, depending on the location of your wound.    DISCHARGE INSTRUCTIONS:    Care for your wound:   •Clean: ?You may be able to shower in 24 hours. Do not soak your wound under water.      ?Gently wash your wound with soap and warm water daily. Lightly pat it dry. Do not cover your wound unless your healthcare provider tells you to.       ?You may also need to clean your wound with a mixture of hydrogen peroxide and water. Ask how to do this.      ?Do not apply ointment or cream to the wound unless your healthcare provider tells you to.      •Elevate: ?Rest any arm or leg that has a wound on pillows above the level of your heart. Do this as often as possible for 2 days. This will help decrease swelling and pain, and help you heal faster.          •Minimize scarring: ?Avoid sunshine on your wound to reduce scarring.             Follow up with your healthcare provider as directed: You may need to return for a wound checkup 3 days after your staples are placed. Ask when you should return to get your staples removed.    Staple removal:   •A medical staple remover will be used to take out your staples. Your healthcare provider will slide the tool under each staple, squeeze the handle, and gently pull the staple out.       •Medical tape will be placed on your wound once your staples are removed. This will help keep your wound closed. The medical tape will fall off on its own after several days.       Contact your healthcare provider if:   •You have redness, pain, swelling, and pus draining from your wound.      •Your pain medicine does not relieve your pain.      •You have a fever of 101°F (38.5°C) or higher.      •You have an odor coming from your wound.      •You have questions or concerns about your condition or care.      Return to the emergency department if:   •Your wound reopens.      •You have red streaks in your skin that spread out from your wound.      •You have severe pain or vomiting.

## 2022-02-07 NOTE — ED ADULT NURSE NOTE - INTERVENTIONS DEFINITIONS
[place] to call system/Call bell, personal items and telephone within reach/Instruct patient to call for assistance/Room bathroom lighting operational/Non-slip footwear when patient is off stretcher/Physically safe environment: no spills, clutter or unnecessary equipment/Stretcher in lowest position, wheels locked, appropriate side rails in place/Provide visual cue, wrist band, yellow gown, etc./Monitor gait and stability

## 2022-09-16 NOTE — ED ADULT NURSE NOTE - ISOLATION TYPE:
Patient was seen in clinic on 9-15-22 and her blood pressure was elevated at the clinic visit.  She was gong to have this rechecked today at her 's visit.  BP today was 134/86.  Will document this blood pressure in her chart from the visit for 9-15-22 to get that on the vitals flow sheet.    Chani [place], CMA     None

## 2023-04-11 NOTE — ED PROVIDER NOTE - PROGRESS NOTE DETAILS
Negative
Reevaluated patient at bedside.  resting comfortably.  spoke with Dr. Richter. work up in ED negative. appropriate for discharge back to [facility]. Patient will return with any changes, concerns or persistent / worsening symptoms.  Understanding of all instructions verbalized.

## 2023-06-23 NOTE — ED ADULT NURSE NOTE - NSIMPLEMENTINTERV_GEN_ALL_ED
Bed: ED 05  Expected date:   Expected time:   Means of arrival:   Comments:  POV patient   Implemented All Fall with Harm Risk Interventions:  [place] to call system. Call bell, personal items and telephone within reach. Instruct patient to call for assistance. Room bathroom lighting operational. Non-slip footwear when patient is off stretcher. Physically safe environment: no spills, clutter or unnecessary equipment. Stretcher in lowest position, wheels locked, appropriate side rails in place. Provide visual cue, wrist band, yellow gown, etc. Monitor gait and stability. Monitor for mental status changes and reorient to person, place, and time. Review medications for side effects contributing to fall risk. Reinforce activity limits and safety measures with patient and family. Provide visual clues: red socks.

## 2023-07-28 NOTE — ED ADULT NURSE NOTE - NSSEPSISSUSPECTED_ED_A_ED
[Home] : at home, [unfilled] , at the time of the visit. [Medical Office: ([facility])___] : at the medical office located in  [Verbal consent obtained from patient] : the patient, [unfilled] [[email or web address]] : Pt complained 2 days of urinary burning, frequency and urgency, seeing blood in the urine today, no fever. Pt denied abdominal pain and flank pain. Pt had UTI episodes before.\par \par  No

## 2023-11-06 NOTE — ED ADULT TRIAGE NOTE - BP NONINVASIVE DIASTOLIC (MM HG)
56 Island Pedicle Flap-Requiring Vessel Identification Text: The defect edges were debeveled with a #15 scalpel blade. Given the location of the defect, shape of the defect and the proximity to free margins an island pedicle advancement flap was deemed most appropriate. Using a sterile surgical marker, an appropriate advancement flap was drawn, based on the axial vessel mentioned above, incorporating the defect, outlining the appropriate donor tissue and placing the expected incisions within the relaxed skin tension lines where possible. The area thus outlined was incised deep to adipose tissue with a #15 scalpel blade. The skin margins were undermined to an appropriate distance in all directions around the primary defect and laterally outward around the island pedicle utilizing iris scissors.  There was minimal undermining beneath the pedicle flap. Following this, the designed flap was carried over into the primary defect and sutured into place.

## 2025-01-19 NOTE — ED ADULT NURSE NOTE - DISCHARGE DATE/TIME
Pt belongings bagged and inspected by this RN and ERT. Belongings inventoried and given to Public Safety for safe lock-up.     Bag [id number]   07-Feb-2022 02:57

## 2025-01-31 NOTE — ED ADULT NURSE NOTE - NSFALLRSKASSISTTYPE_ED_ALL_ED
Post-Op Assessment Note    CV Status:  Stable    Pain management: adequate       Mental Status:  Alert and awake   Hydration Status:  Euvolemic and stable   PONV Controlled:  None   Airway Patency:  Patent     Post Op Vitals Reviewed: Yes    No anethesia notable event occurred.    Staff: Anesthesiologist           Last Filed PACU Vitals:  Vitals Value Taken Time   Temp     Pulse 67 01/31/25 0905   /55 01/31/25 0905   Resp 12 01/31/25 0905   SpO2 99 % 01/31/25 0905       Modified Joseph:     Vitals Value Taken Time   Activity 2 01/31/25 0906   Respiration 2 01/31/25 0906   Circulation 2 01/31/25 0906   Consciousness 2 01/31/25 0906   Oxygen Saturation 2 01/31/25 0906     Modified Joseph Score: 10            
Standing/Walking/Toileting
